# Patient Record
Sex: FEMALE | Race: WHITE | Employment: FULL TIME | URBAN - NONMETROPOLITAN AREA
[De-identification: names, ages, dates, MRNs, and addresses within clinical notes are randomized per-mention and may not be internally consistent; named-entity substitution may affect disease eponyms.]

---

## 2017-01-21 ENCOUNTER — HOSPITAL ENCOUNTER (OUTPATIENT)
Facility: HOSPITAL | Age: 22
Discharge: HOME | End: 2017-01-21
Attending: OBSTETRICS & GYNECOLOGY | Admitting: OBSTETRICS & GYNECOLOGY
Payer: COMMERCIAL

## 2017-01-21 VITALS
TEMPERATURE: 97.4 F | SYSTOLIC BLOOD PRESSURE: 121 MMHG | BODY MASS INDEX: 25.61 KG/M2 | OXYGEN SATURATION: 97 % | WEIGHT: 169 LBS | HEART RATE: 87 BPM | DIASTOLIC BLOOD PRESSURE: 63 MMHG | HEIGHT: 68 IN

## 2017-01-21 PROBLEM — Z36.89 ENCOUNTER FOR TRIAGE IN PREGNANT PATIENT: Status: ACTIVE | Noted: 2017-01-21

## 2017-01-21 LAB
ALBUMIN UR-MCNC: NEGATIVE MG/DL
APPEARANCE UR: CLEAR
BACTERIA #/AREA URNS HPF: ABNORMAL /HPF
BILIRUB UR QL STRIP: NEGATIVE
COLOR UR AUTO: ABNORMAL
GLUCOSE UR STRIP-MCNC: NEGATIVE MG/DL
HGB UR QL STRIP: NEGATIVE
KETONES UR STRIP-MCNC: NEGATIVE MG/DL
LEUKOCYTE ESTERASE UR QL STRIP: NEGATIVE
NITRATE UR QL: NEGATIVE
PH UR STRIP: 6.5 PH (ref 4.7–8)
RBC #/AREA URNS AUTO: 0 /HPF (ref 0–2)
SP GR UR STRIP: 1 (ref 1–1.03)
SQUAMOUS #/AREA URNS AUTO: 3 /HPF (ref 0–1)
URN SPEC COLLECT METH UR: ABNORMAL
UROBILINOGEN UR STRIP-MCNC: NORMAL MG/DL (ref 0–2)
WBC #/AREA URNS AUTO: <1 /HPF (ref 0–2)

## 2017-01-21 PROCEDURE — 99214 OFFICE O/P EST MOD 30 MIN: CPT

## 2017-01-21 PROCEDURE — 81001 URINALYSIS AUTO W/SCOPE: CPT | Performed by: OBSTETRICS & GYNECOLOGY

## 2017-01-21 NOTE — IP AVS SNAPSHOT
MRN:[id number]                      After Visit Summary   1/21/2017    Frances Garcia    MRN: [id number]           Thank you!     Thank you for choosing [facility] for your care. Our goal is always to provide you with excellent care. Hearing back from our patients is one way we can continue to improve our services. Please take a few minutes to complete the written survey that you may receive in the mail after you visit with us. Thank you!        Patient Information     Date Of Birth          1995        About your hospital stay     You were admitted on:  January 21, 2017 You last received care in the:  [facility]    You were discharged on:  January 21, 2017       Who to Call     For medical emergencies, please call 911.  For non-urgent questions about your medical care, please call your primary care provider or clinic, [phone]          Attending Provider     Provider    Macario Rodriguez MD       Primary Care Provider Office Phone # Fax #    Ila NETTIE Vera [phone]       [facility] [address] [place] [address]        Further instructions from your care team       Discharge Instruction for Undelivered Patients      You were seen for: Bleeding Assessment  We Consulted: Dr. Rodriguez  You had (Test or Medicine): Urine     Diet:   Drink 8 to 12 glasses of liquids (milk, juice, water) every day.     Activity:  Rest the pelvic area. No sex. Do not stimulate breasts or nipples.     Call your provider if you notice:  Swelling in your face or increased swelling in your hands or legs.  Headaches that are not relieved by Tylenol (acetaminophen).  Changes in your vision (blurring: seeing spots or stars.)  Nausea (sick to your stomach) and vomiting (throwing up).   Weight gain of 5 pounds or more per week.  Heartburn that doesn't go away.  Signs of bladder infection: pain when you urinate (use the toilet), need to go more often and more urgently.  The  "bag of martinez (rupture of membranes) breaks, or you notice leaking in your underwear.  Bright red blood in your underwear.  Abdominal (lower belly) or stomach pain.  For first baby: Contractions (tightening) less than 5 minutes apart for one hour or more.  Second (plus) baby: Contractions (tightening) less than 10 minutes apart and getting stronger.  *If less than 34 weeks: Contractions (tightenings) more than 6 times in one hour.  Increase or change in vaginal discharge (note the color and amount)    Follow-up:  As scheduled in the clinic          Pending Results     Date and Time Order Name Status Description    2017 1744 UA with Microscopic reflex to Culture In process             Admission Information        Provider Department Dept Phone    2017 Macario Rodriguez MD [facility] [phone]      Your Vitals Were     Blood Pressure Pulse Temperature    121/63 mmHg 87 97.4  F (36.3  C) (Oral)    Height Weight BMI (Body Mass Index)    1.727 m (5' 8\") 76.658 kg (169 lb) 25.70 kg/m2    Pulse Oximetry          97%        MyChart Information     [employer] lets you send messages to your doctor, view your test results, renew your prescriptions, schedule appointments and more. To sign up, go to [email or web address] . Click on \"Log in\" on the left side of the screen, which will take you to the Welcome page. Then click on \"Sign up Now\" on the right side of the page.     You will be asked to enter the access code listed below, as well as some personal information. Please follow the directions to create your username and password.     Your access code is: [email or web address]  Expires: 3/2/2017  5:21 AM     Your access code will  in 90 days. If you need help or a new code, please call your [facility] or [phone].        Care EveryWhere ID     This is your Care EveryWhere ID. This could be used by other organizations to access your [facility] medical records  [id number]           Review of your " medicines      UNREVIEWED medicines. Ask your doctor about these medicines        Dose / Directions    AMOXICILLIN PO   Indication:  Dental infection        Dose:  875 mg   Take 875 mg by mouth 2 times daily   Refills:  0       RANITIDINE HCL PO        Dose:  75 mg   Take 75 mg by mouth daily   Refills:  0       UNABLE TO FIND        Prenatal vit 1 daily   Refills:  0                Protect others around you: Learn how to safely use, store and throw away your medicines at [email or web address].             Medication List: This is a list of all your medications and when to take them. Check marks below indicate your daily home schedule. Keep this list as a reference.      Medications           Morning Afternoon Evening Bedtime As Needed    AMOXICILLIN PO   Take 875 mg by mouth 2 times daily                                RANITIDINE HCL PO   Take 75 mg by mouth daily                                UNABLE TO FIND   Prenatal vit 1 daily

## 2017-01-21 NOTE — IP AVS SNAPSHOT
[facility]    [address] [address]    Phone:  [phone]    Fax:  [phone]                                       After Visit Summary   1/21/2017    Frances Garcia    MRN: [id number]           After Visit Summary Signature Page     I have received my discharge instructions, and my questions have been answered. I have discussed any challenges I see with this plan with the nurse or doctor.    ..........................................................................................................................................  Patient/Patient Representative Signature      ..........................................................................................................................................  Patient Representative Print Name and Relationship to Patient    ..................................................               ................................................  Date                                            Time    ..........................................................................................................................................  Reviewed by Signature/Title    ...................................................              ..............................................  Date                                                            Time

## 2017-01-21 NOTE — PLAN OF CARE
OB Triage Note  Frances Garcia  MRN: [id number]  Gestational Age: 22w2d      Frances Garcia presents for bleeding/spotting (sign/symptom/concern).      Not adam, started cramping today. Bleeding: spotting  began this afternoon.  Denies LOF.      Dr Rodriguez notified of arrival and condition.  Oriented patient to surroundings. Call light within reach.     FHR: 145 with doppler    Plan:  -Doppler FHR  -Sterile vaginal exam/sterile speculum exam.  -Nursing education on symptoms of  labor provided.

## 2017-01-22 NOTE — DISCHARGE INSTRUCTIONS
Discharge Instruction for Undelivered Patients      You were seen for: Bleeding Assessment  We Consulted: Dr. Rodriguez  You had (Test or Medicine): Urine     Diet:   Drink 8 to 12 glasses of liquids (milk, juice, water) every day.     Activity:  Rest the pelvic area. No sex. Do not stimulate breasts or nipples.     Call your provider if you notice:  Swelling in your face or increased swelling in your hands or legs.  Headaches that are not relieved by Tylenol (acetaminophen).  Changes in your vision (blurring: seeing spots or stars.)  Nausea (sick to your stomach) and vomiting (throwing up).   Weight gain of 5 pounds or more per week.  Heartburn that doesn't go away.  Signs of bladder infection: pain when you urinate (use the toilet), need to go more often and more urgently.  The bag of martinez (rupture of membranes) breaks, or you notice leaking in your underwear.  Bright red blood in your underwear.  Abdominal (lower belly) or stomach pain.  For first baby: Contractions (tightening) less than 5 minutes apart for one hour or more.  Second (plus) baby: Contractions (tightening) less than 10 minutes apart and getting stronger.  *If less than 34 weeks: Contractions (tightenings) more than 6 times in one hour.  Increase or change in vaginal discharge (note the color and amount)    Follow-up:  As scheduled in the clinic

## 2017-01-22 NOTE — PLAN OF CARE
"Frances Garcia is a 21 year old  and 22w2d patient came in complaining of Vaginal Bleeding    Patient Active Problem List   Diagnosis     Encounter for triage in pregnant patient       Pt discharged to home at 6:12 PM and encouraged to rest and drink plenty of fluids.  Pt told to call/return if bleeding more than spotting, water breaks, contractions 3-5 minutes apart that she has to breath through.     Nursing education on  labor symptoms provided. Self-management instructions reviewed. AVS given and signed. All questions answered and patient verbalizes understanding.    /63 mmHg  Pulse 87  Temp(Src) 97.4  F (36.3  C) (Oral)  Ht 1.727 m (5' 8\")  Wt 76.658 kg (169 lb)  BMI 25.70 kg/m2  SpO2 97%    Cervical status: not examined  Fetal Assessment: Appropriate for Gestational Age    Discharge support:  Family/Friend Support    Obstetric History       T0      TAB0   SAB0   E0   M0   L0       # Outcome Date GA Lbr Srini/2nd Weight Sex Delivery Anes PTL Lv   1 Current                   Makenzie Aguirre      "

## 2017-05-24 ENCOUNTER — HOSPITAL ENCOUNTER (OUTPATIENT)
Facility: HOSPITAL | Age: 22
Discharge: HOME | End: 2017-05-24
Attending: OBSTETRICS & GYNECOLOGY | Admitting: OBSTETRICS & GYNECOLOGY
Payer: COMMERCIAL

## 2017-05-24 ENCOUNTER — TELEPHONE (OUTPATIENT)
Dept: OBGYN | Facility: HOSPITAL | Age: 22
End: 2017-05-24

## 2017-05-24 VITALS — DIASTOLIC BLOOD PRESSURE: 76 MMHG | TEMPERATURE: 97.4 F | OXYGEN SATURATION: 97 % | SYSTOLIC BLOOD PRESSURE: 124 MMHG

## 2017-05-24 PROCEDURE — 99213 OFFICE O/P EST LOW 20 MIN: CPT

## 2017-05-24 PROCEDURE — 99213 OFFICE O/P EST LOW 20 MIN: CPT | Mod: 25

## 2017-05-24 PROCEDURE — 59025 FETAL NON-STRESS TEST: CPT | Mod: 59

## 2017-05-24 NOTE — IP AVS SNAPSHOT
MRN:[id number]                      After Visit Summary   5/24/2017    Frances Garcia    MRN: [id number]           Thank you!     Thank you for choosing [facility] for your care. Our goal is always to provide you with excellent care. Hearing back from our patients is one way we can continue to improve our services. Please take a few minutes to complete the written survey that you may receive in the mail after you visit with us. Thank you!        Patient Information     Date Of Birth          1995        Designated Caregiver       Most Recent Value    Caregiver    Will someone help with your care after discharge? no      About your hospital stay     You were admitted on:  May 24, 2017 You last received care in the:  [facility]    You were discharged on:  May 24, 2017       Who to Call     For medical emergencies, please call 911.  For non-urgent questions about your medical care, please call your primary care provider or clinic, [phone]          Attending Provider     Provider Specialty    Landon Rutherford MD OB/Gyn       Primary Care Provider Office Phone # Fax #    Ila Vera [phone]       [facility] [address] [facility] [address]        Further instructions from your care team       Discharge Instructions for Undelivered Patients    Diet:  * Drink 8 to 12 glasses of liquids (milk, juice, water) every day  * You may eat meals and snacks.    Activity:  * Count fetal kicks every day.  * Call your doctor if your baby is moving less than usual.    Call your provider if you notice:  * Swelling in your face or increased swelling in your hands or legs.  * Headaches that are not relieved by Tylenol (acetaminophen).  * Changes in your vision (blurring; seeing spots or stars).  * Nausea (sick to your stomach) and vomiting (throwing up).  * Weight gain of 5 pounds per week.  * Heartburn that doesn't go away.  * Signs of bladder infection:  "Pain when you urinate (use the toilet), needing to go more often or more urgently.  * The bag of martinez (membrane) breaks, or you notice leaking in your underwear.  * Bright red blood in your underwear.  * Abdominal (lower belly) or stomach pain.  * For first baby: Contractions (tightenings) less than 5 minutes apart for one hour or more.  * Second (plus) baby: Contractions (tightenings) less than 10 minutes apart and getting stronger.  * Increase or change in vaginal discharge (note the color and amount).    Return to [facility] when contractions become more painful.    [facility]: [phone]        Pending Results     No orders found from 2017 to 2017.            Admission Information     Date & Time Provider Department Dept. Phone    2017 Landon Rutherford MD [facility] [phone]      Your Vitals Were     Blood Pressure Temperature Pulse Oximetry             124/76 97.4  F (36.3  C) (Oral) 97%         MyChart Information     [email or web address] lets you send messages to your doctor, view your test results, renew your prescriptions, schedule appointments and more. To sign up, go to [email or web address] . Click on \"Log in\" on the left side of the screen, which will take you to the Welcome page. Then click on \"Sign up Now\" on the right side of the page.     You will be asked to enter the access code listed below, as well as some personal information. Please follow the directions to create your username and password.     Your access code is: [email or web address]  Expires: 2017 10:32 AM     Your access code will  in 90 days. If you need help or a new code, please call your [facility] clinic or [phone].        Care EveryWhere ID     This is your Care EveryWhere ID. This could be used by other organizations to access your [facility] medical records  [id number]           Review of your medicines      UNREVIEWED medicines. Ask your doctor about these medicines        " Dose / Directions    AMOXICILLIN PO   Indication:  Dental infection        Dose:  875 mg   Take 875 mg by mouth 2 times daily   Refills:  0       RANITIDINE HCL PO        Dose:  75 mg   Take 75 mg by mouth daily   Refills:  0       UNABLE TO FIND        Prenatal vit 1 daily   Refills:  0                Protect others around you: Learn how to safely use, store and throw away your medicines at www.disposemymeds.org.             Medication List: This is a list of all your medications and when to take them. Check marks below indicate your daily home schedule. Keep this list as a reference.      Medications           Morning Afternoon Evening Bedtime As Needed    AMOXICILLIN PO   Take 875 mg by mouth 2 times daily                                RANITIDINE HCL PO   Take 75 mg by mouth daily                                UNABLE TO FIND   Prenatal vit 1 daily

## 2017-05-24 NOTE — DISCHARGE INSTRUCTIONS
Discharge Instructions for Undelivered Patients    Diet:  * Drink 8 to 12 glasses of liquids (milk, juice, water) every day  * You may eat meals and snacks.    Activity:  * Count fetal kicks every day.  * Call your doctor if your baby is moving less than usual.    Call your provider if you notice:  * Swelling in your face or increased swelling in your hands or legs.  * Headaches that are not relieved by Tylenol (acetaminophen).  * Changes in your vision (blurring; seeing spots or stars).  * Nausea (sick to your stomach) and vomiting (throwing up).  * Weight gain of 5 pounds per week.  * Heartburn that doesn't go away.  * Signs of bladder infection: Pain when you urinate (use the toilet), needing to go more often or more urgently.  * The bag of martinez (membrane) breaks, or you notice leaking in your underwear.  * Bright red blood in your underwear.  * Abdominal (lower belly) or stomach pain.  * For first baby: Contractions (tightenings) less than 5 minutes apart for one hour or more.  * Second (plus) baby: Contractions (tightenings) less than 10 minutes apart and getting stronger.  * Increase or change in vaginal discharge (note the color and amount).    Return to [facility] when contractions become more painful.    [facility]: [phone]

## 2017-05-24 NOTE — TELEPHONE ENCOUNTER
2017 9:21 AM  Frances Garcia 1995 [phone] (home)   Pt of Dr: No data on file.   Estimated Date of Delivery: May 25, 2017 39w6d    Patient Active Problem List   Diagnosis     Encounter for triage in pregnant patient         Spoke with Frances Garcia   Pt lives 10 miles away.  Reason for call per pt  contractions  Are you having contractions? Yes.  Contractions since this am around 8am.  Contractions are irregular, maybe every 10-15 minutes.  Pain with contractions are a 10/10.   Are you having any bloody show: No  Are you leaking any fluids/ has your water broken? No  Is your baby moving normally: Yes  Did you have any complications with this or a previous pregnancy? Yes: bleeding at 26 wks gestation, pt stated she was diagnosed with a low lying placenta, but pt stated they told her the placenta has moved up.      Backache: Yes.  Pt reports she has had back pain.    Pressure: Yes.  Pt reports pressure vaginal pressure.   Vaginal discharge? No  Cervical Status: Pt stated she was 2cm on Thursday last week when seen in [place], per Dr Ojeda.     Patient advised: That she is welcome to come to the hospital and be checked PRN.  Reviewed the signs of early labor and how close we want the contractions to be for a primip.  Pt stated she would like to come in and be seen.  Pt stated she normally doctors in [place] but wants to delivery in [place] because she lives closer.  Will await pt's arrival      Call taken by Mel Byers

## 2017-05-24 NOTE — PLAN OF CARE
Pt discharged to home via ambulatory.  AVS signed and pt questions answered.  Pt will follow up at [facility] when she feels like labor is progressing with contractions becoming more painful.

## 2017-05-24 NOTE — IP AVS SNAPSHOT
[facility]    [address] [address]    Phone:  [phone]    Fax:  [phone]                                       After Visit Summary   5/24/2017    Frances Garcia    MRN: [id number]           After Visit Summary Signature Page     I have received my discharge instructions, and my questions have been answered. I have discussed any challenges I see with this plan with the nurse or doctor.    ..........................................................................................................................................  Patient/Patient Representative Signature      ..........................................................................................................................................  Patient Representative Print Name and Relationship to Patient    ..................................................               ................................................  Date                                            Time    ..........................................................................................................................................  Reviewed by Signature/Title    ...................................................              ..............................................  Date                                                            Time

## 2017-05-24 NOTE — PLAN OF CARE
OB Triage Note  Frances Garcia  MRN: [id number]  Gestational Age: 39w6d      Frances Garcia presents for contractions (sign/symptom/concern).      States adam since 8 am.  Rates pain at 2/10.  Bleeding: light  began at 8:00 am.  Denies LOF.  Reports none  amount.    Dr Rutherford notified of arrival and condition.  Oriented patient to surroundings. Call light within reach.     FHT: reassuring  NST Start Time:953  NST Stop Time:1013  NST: Reactive.  Uterine Assessment:Contractions: frequency q 2-3 minutes of mild quality.    Plan:  -Initial NST, then fetal/uterine monitoring per MD/patient plan.  -Sterile vaginal exam/sterile speculum exam.  -Nursing education on early labor provided.

## 2017-05-30 NOTE — PROGRESS NOTES
Fetal Non-Stress Test Results    NST Ordered By: Landon Rutherford       NST Start & Stop Times                                  NST Results  Fetus A   Baseline Rate: 130  Accelerations: Present  Decelerations: None  Interpretation: reactive

## 2018-10-28 ENCOUNTER — HOSPITAL ENCOUNTER (EMERGENCY)
Facility: HOSPITAL | Age: 23
Discharge: HOME | End: 2018-10-28
Attending: PHYSICIAN ASSISTANT | Admitting: PHYSICIAN ASSISTANT
Payer: COMMERCIAL

## 2018-10-28 VITALS
SYSTOLIC BLOOD PRESSURE: 141 MMHG | RESPIRATION RATE: 16 BRPM | OXYGEN SATURATION: 100 % | DIASTOLIC BLOOD PRESSURE: 90 MMHG | TEMPERATURE: 98.1 F | HEART RATE: 91 BPM

## 2018-10-28 DIAGNOSIS — S81.811A LACERATION OF RIGHT LOWER EXTREMITY, INITIAL ENCOUNTER: ICD-10-CM

## 2018-10-28 PROCEDURE — 12002 RPR S/N/AX/GEN/TRNK2.6-7.5CM: CPT

## 2018-10-28 PROCEDURE — 12002 RPR S/N/AX/GEN/TRNK2.6-7.5CM: CPT | Performed by: PHYSICIAN ASSISTANT

## 2018-10-28 PROCEDURE — 40000268 ZZH STATISTIC NO CHARGES

## 2018-10-28 ASSESSMENT — ENCOUNTER SYMPTOMS
PSYCHIATRIC NEGATIVE: 1
WOUND: 1
CARDIOVASCULAR NEGATIVE: 1
RESPIRATORY NEGATIVE: 1
CONSTITUTIONAL NEGATIVE: 1

## 2018-10-28 NOTE — DISCHARGE INSTRUCTIONS
- Clean/dry for 24 hrs.   - Dressing changes daily for 2-3 days.   - Rest, elevate, tylenol for pain.   - Monitor for infection.   - Follow up in 10-14 days for suture removal, sooner with concern for infection.

## 2018-10-28 NOTE — ED AVS SNAPSHOT
[facility]    [address] [address]    Phone:  [phone]                                       Frances Garcia   MRN: [id number]    Department:  [facility]   Date of Visit:  10/28/2018           Patient Information     Date Of Birth          1995        Your diagnoses for this visit were:     Laceration of right lower extremity, initial encounter        You were seen by Jewel Frazier PA.      Follow-up Information     Follow up with Ila Vera NP In 10 days.    Specialty:  Nurse Practitioner    Why:  For suture removal    Contact information:    [facility]  [address] [facility] [address] [phone]          Follow up with [facility].    Specialty:  EMERGENCY MEDICINE    Why:  If symptoms worsen    Contact information:    [address] [address] [phone]    Additional information:    From [place]: Take [address]. Turn left at [address]. Turn left at the first stoplight on [address]. At the first stop sign, take a right onto [address]. Take a left into the parking lot and continue through until you reach the North enterance of the building.       From [place]: Take [address]. Take the [address] ramp towards [place]. Turn left onto [address]. Take a slight right onto [address] [facility]. Turn left at the first stoplight on [address]. At the first stop sign, take a right onto [address]. Take a left into the parking lot and continue through until you reach the North enterance of the building.       From [place]: Take [address]. Take a right at [address]. At the first stop sign, take a right onto [address]. Take a left into the parking lot and continue through until you reach the North enterance of the building.         Discharge Instructions       - Clean/dry for 24 hrs.   - Dressing changes  "daily for 2-3 days.   - Rest, elevate, tylenol for pain.   - Monitor for infection.   - Follow up in 10-14 days for suture removal, sooner with concern for infection.     Discharge References/Attachments     LACERATION, EXTREMITY: STITCHES, STAPLE, OR TAPE (ENGLISH)         Review of your medicines      Notice     You have not been prescribed any medications.            Procedures and tests performed during your visit     Laceration repair      Orders Needing Specimen Collection     None      Pending Results     No orders found from 10/26/2018 to 10/29/2018.            Pending Culture Results     No orders found from 10/26/2018 to 10/29/2018.            Thank you for choosing [facility]       Thank you for choosing [facility] for your care. Our goal is always to provide you with excellent care. Hearing back from our patients is one way we can continue to improve our services. Please take a few minutes to complete the written survey that you may receive in the mail after you visit with us. Thank you!        [employer] Information     Shoutlet lets you send messages to your doctor, view your test results, renew your prescriptions, schedule appointments and more. To sign up, go to [email or web address] . Click on \"Log in\" on the left side of the screen, which will take you to the Welcome page. Then click on \"Sign up Now\" on the right side of the page.     You will be asked to enter the access code listed below, as well as some personal information. Please follow the directions to create your username and password.     Your access code is: [email or web address]  Expires: 2019  6:26 PM     Your access code will  in 90 days. If you need help or a new code, please call your [facility] clinic or [phone].        Care EveryWhere ID     This is your Care EveryWhere ID. This could be used by other organizations to access your [facility] medical records  [id number]        Equal Access to Services     HILTON PULIDO AH: Michael guillermo " bruno Simons, shyla garrison, eagle stoner, denisse goddard. So [facility] [phone].    ATENCIÓN: Si habla español, tiene a chirinos disposición servicios gratuitos de asistencia lingüística. Llame al [phone].    We comply with applicable federal civil rights laws and [place] laws. We do not discriminate on the basis of race, color, national origin, age, disability, sex, sexual orientation, or gender identity.            After Visit Summary       This is your record. Keep this with you and show to your community pharmacist(s) and doctor(s) at your next visit.

## 2018-10-28 NOTE — ED TRIAGE NOTES
Pt presents today with family for a laceration to her right inner proximal shin she sustained on a piece of video game equipment she was trying to throw away.

## 2018-10-28 NOTE — ED AVS SNAPSHOT
[facility]    [address] [address]    Phone:  [phone]                                       Frances Garcia   MRN: [id number]    Department:  [facility]   Date of Visit:  10/28/2018           After Visit Summary Signature Page     I have received my discharge instructions, and my questions have been answered. I have discussed any challenges I see with this plan with the nurse or doctor.    ..........................................................................................................................................  Patient/Patient Representative Signature      ..........................................................................................................................................  Patient Representative Print Name and Relationship to Patient    ..................................................               ................................................  Date                                   Time    ..........................................................................................................................................  Reviewed by Signature/Title    ...................................................              ..............................................  Date                                               Time          22EPIC Rev 08/18

## 2018-10-28 NOTE — ED PROVIDER NOTES
History     Chief Complaint   Patient presents with     Laceration     right leg laceration, bleeding controlled     HPI  Frances Garcia is a 22 year old female who presents with lac to right lower leg. She cut her leg with a motherboard from an old game console prior to arrival. She states it is too wide to just leave alone, so she came in for wound repair. Bleeding controlled. Last tetanus was last year.     Problem List:    Patient Active Problem List    Diagnosis Date Noted     Encounter for triage in pregnant patient 01/21/2017     Priority: Medium        Past Medical History:    Past Medical History:   Diagnosis Date     Uncomplicated asthma        Past Surgical History:    Past Surgical History:   Procedure Laterality Date     ESOPHAGOSCOPY, GASTROSCOPY, DUODENOSCOPY (EGD), COMBINED N/A 11/6/2015    Procedure: COMBINED ESOPHAGOSCOPY, GASTROSCOPY, DUODENOSCOPY (EGD);  Surgeon: Gregory Boyce MD;  Location: [facility]       Family History:    No family history on file.    Social History:  Marital Status:  Single [1]  Social History   Substance Use Topics     Smoking status: Never Smoker     Smokeless tobacco: Never Used     Alcohol use No        Medications:      No current outpatient prescriptions on file.      Review of Systems   Constitutional: Negative.    Respiratory: Negative.    Cardiovascular: Negative.    Skin: Positive for wound.   Psychiatric/Behavioral: Negative.        Physical Exam   BP: 141/90  Pulse: 91  Temp: 98.1  F (36.7  C)  Resp: 16  SpO2: 100 %      Physical Exam   Constitutional: She is oriented to person, place, and time. She appears well-developed and well-nourished. No distress.   Cardiovascular: Normal rate.    Pulmonary/Chest: Effort normal.   Musculoskeletal:        Legs:  Neurological: She is alert and oriented to person, place, and time.   Skin: Skin is warm and dry.   Psychiatric: She has a normal mood and affect.   Nursing note and vitals reviewed.      ED Course     ED  Course     Laceration repair  Date/Time: 10/28/2018 5:55 PM  Performed by: YANET FRAZIER  Authorized by: YANET FRAZIER   Consent: Verbal consent obtained.  Risks and benefits: risks, benefits and alternatives were discussed  Consent given by: patient  Body area: lower extremity  Location details: right lower leg  Laceration length: 5.5 cm  Foreign bodies: no foreign bodies  Tendon involvement: none  Nerve involvement: none  Anesthesia: local infiltration    Anesthesia:  Local Anesthetic: lidocaine 1% with epinephrine  Anesthetic total: 8 mL  Preparation: Patient was prepped and draped in the usual sterile fashion.  Irrigation solution: saline  Irrigation method: syringe  Amount of cleaning: standard  Debridement: none  Degree of undermining: none  Skin closure: 4-0 nylon  Number of sutures: 7  Technique: simple  Approximation: close  Approximation difficulty: simple  Dressing: antibiotic ointment  Patient tolerance: Patient tolerated the procedure well with no immediate complications          Assessments & Plan (with Medical Decision Making)     I have reviewed the nursing notes.  I have reviewed the findings, diagnosis, plan and need for follow up with the patient.    There are no discharge medications for this patient.      Final diagnoses:   Laceration of right lower extremity, initial encounter   7 sutures placed as above. Clean/dry for 24 hrs. Dressing changes daily for 2-3 days. Rest, elevate, tylenol for pain. Monitor for infection. Follow up in 10-14 days for suture removal, sooner with concern for infection. Patient verbally educated and given appropriate education sheets for the diagnoses and has no questions.    Yanet Frazier PA-C   10/28/2018   7:30 PM        10/28/2018   [facility]     Yanet Frazier PA  10/28/18 1931

## 2022-11-26 ENCOUNTER — HOSPITAL ENCOUNTER (EMERGENCY)
Facility: HOSPITAL | Age: 27
Discharge: HOME | End: 2022-11-26
Attending: NURSE PRACTITIONER | Admitting: NURSE PRACTITIONER
Payer: COMMERCIAL

## 2022-11-26 VITALS
TEMPERATURE: 97.3 F | RESPIRATION RATE: 16 BRPM | HEART RATE: 102 BPM | SYSTOLIC BLOOD PRESSURE: 111 MMHG | DIASTOLIC BLOOD PRESSURE: 79 MMHG | OXYGEN SATURATION: 97 %

## 2022-11-26 DIAGNOSIS — J45.901 ASTHMA EXACERBATION: Primary | ICD-10-CM

## 2022-11-26 DIAGNOSIS — R05.1 ACUTE COUGH: ICD-10-CM

## 2022-11-26 DIAGNOSIS — J45.901 EXACERBATION OF ASTHMA, UNSPECIFIED ASTHMA SEVERITY, UNSPECIFIED WHETHER PERSISTENT: ICD-10-CM

## 2022-11-26 PROCEDURE — G0463 HOSPITAL OUTPT CLINIC VISIT: HCPCS

## 2022-11-26 PROCEDURE — C9803 HOPD COVID-19 SPEC COLLECT: HCPCS

## 2022-11-26 PROCEDURE — 99213 OFFICE O/P EST LOW 20 MIN: CPT | Performed by: NURSE PRACTITIONER

## 2022-11-26 PROCEDURE — 87637 SARSCOV2&INF A&B&RSV AMP PRB: CPT | Performed by: NURSE PRACTITIONER

## 2022-11-26 RX ORDER — METHYLPREDNISOLONE 4 MG
TABLET, DOSE PACK ORAL
Qty: 21 TABLET | Refills: 0 | Status: SHIPPED | OUTPATIENT
Start: 2022-11-26 | End: 2023-01-19

## 2022-11-26 ASSESSMENT — ENCOUNTER SYMPTOMS
COUGH: 1
SORE THROAT: 0
SHORTNESS OF BREATH: 1
CHILLS: 0
FEVER: 0

## 2022-11-26 NOTE — ED TRIAGE NOTES
Pt presents with c/o headache, body aches, sweats, coughing. Reports hx of asthma. States that she had to use her albuterol inhaler 10x yesterday. Sx started yesterday. No otc meds.      Triage Assessment     Row Name 11/26/22 1301       Triage Assessment (Adult)    Airway WDL WDL       Respiratory WDL    Respiratory WDL WDL       Skin Circulation/Temperature WDL    Skin Circulation/Temperature WDL WDL       Cardiac WDL    Cardiac WDL WDL

## 2022-11-26 NOTE — ED PROVIDER NOTES
History     Chief Complaint   Patient presents with     Cough     Nasal Congestion     HPI  Frances Garcia is a 27 year old female who presents to urgent care for evaluation of a cough that started yesterday. She reports having intermittent coughing fits accompanied by sweating and shortness of breath and wheezing. History if asthma but has not had to use her albuterol inhaler for several months. Yesterday, she had to use it about 10 times per her report.  Slight chest pain with coughing.  No tobacco use.  No known fevers at home.  Eating and drinking okay.    Patient states that symptoms are similar to when she had influenza in the past.    Allergies:  Allergies   Allergen Reactions     Dust Mites        Problem List:    Patient Active Problem List    Diagnosis Date Noted     Encounter for triage in pregnant patient 01/21/2017     Priority: Medium        Past Medical History:    Past Medical History:   Diagnosis Date     Uncomplicated asthma        Past Surgical History:    Past Surgical History:   Procedure Laterality Date     ESOPHAGOSCOPY, GASTROSCOPY, DUODENOSCOPY (EGD), COMBINED N/A 11/6/2015    Procedure: COMBINED ESOPHAGOSCOPY, GASTROSCOPY, DUODENOSCOPY (EGD);  Surgeon: Gregory Boyce MD;  Location: [facility]       Family History:    History reviewed. No pertinent family history.    Social History:  Marital Status:  Single [1]  Social History     Tobacco Use     Smoking status: Never     Smokeless tobacco: Never   Substance Use Topics     Alcohol use: No     Drug use: No        Medications:    methylPREDNISolone (MEDROL DOSEPAK) 4 MG tablet therapy pack          Review of Systems   Constitutional: Negative for chills and fever.   HENT: Negative for sore throat.    Respiratory: Positive for cough and shortness of breath.    Cardiovascular: Positive for chest pain (With coughing).   All other systems reviewed and are negative.      Physical Exam   BP: 111/79  Pulse: 102  Temp: 97.3  F (36.3  C)  Resp:  16  SpO2: 97 %      Physical Exam  Vitals and nursing note reviewed.   Constitutional:       Appearance: Normal appearance. She is not ill-appearing or toxic-appearing.   HENT:      Head: Atraumatic.      Right Ear: Tympanic membrane and ear canal normal.      Left Ear: Tympanic membrane and ear canal normal.      Nose: Nose normal.      Mouth/Throat:      Mouth: Mucous membranes are moist.   Eyes:      Extraocular Movements: Extraocular movements intact.      Pupils: Pupils are equal, round, and reactive to light.   Cardiovascular:      Rate and Rhythm: Normal rate and regular rhythm.      Heart sounds: Normal heart sounds. No murmur heard.    No friction rub. No gallop.   Pulmonary:      Effort: Pulmonary effort is normal. No respiratory distress.      Breath sounds: Normal breath sounds. No wheezing or rhonchi.   Musculoskeletal:         General: Normal range of motion.      Cervical back: Neck supple.   Skin:     General: Skin is warm and dry.      Coloration: Skin is not pale.   Neurological:      Mental Status: She is alert and oriented to person, place, and time.         ED Course                 Procedures            No results found for this or any previous visit (from the past 24 hour(s)).    Medications - No data to display    Assessments & Plan (with Medical Decision Making)     I have reviewed the nursing notes.    This is a 27-year-old female that presented for evaluation of acute cough that started yesterday.  Known history of asthma but has not had to use her rescue inhaler for several months.  Patient has a regular heart rate and rhythm.  Respirations are nonlabored.  No adventitious lung sounds auscultated today.  Patient is talking in full sentences.  Her oxygen saturation is 97% on room air.  The rest of her vital signs are within normal limits.    Discussed all findings with patient.  Respiratory viral panel results are pending.  Symptoms appear consistent with acute bronchitis/asthma  exacerbation.  Patient does not appear to be in respiratory distress at this time.  Using shared decision making, imaging was deferred today.  Will treat with a Medrol Dosepak.  Advised patient to continue using inhaler as needed.  Tylenol or ibuprofen as needed for pain.  If symptoms worsen or she develops new or concerning symptoms patient was advised to return to the emergency department for reevaluation.    I have reviewed the findings, diagnosis, plan and need for follow up with the patient.  This document was prepared using a combination of typing and voice generated software.  While every attempt was made for accuracy, spelling and grammatical errors may exist.    Discharge Medication List as of 11/26/2022  1:53 PM      START taking these medications    Details   methylPREDNISolone (MEDROL DOSEPAK) 4 MG tablet therapy pack Follow Package Directions, Disp-21 tablet, R-0, E-Prescribe             Final diagnoses:   Asthma exacerbation   Acute cough       11/26/2022   [facility]     Mpofu, Prudence, CNP  11/26/22 3756

## 2022-11-26 NOTE — DISCHARGE INSTRUCTIONS
Take the steroid as prescribed.  Continue using albuterol inhaler as needed.    Tylenol as needed for any pain or fever.    We will notify you of your results when available.    Return to emergency department for any worsening or concerning symptoms.

## 2022-11-27 LAB
FLUAV RNA SPEC QL NAA+PROBE: POSITIVE
FLUBV RNA RESP QL NAA+PROBE: NEGATIVE
RSV RNA SPEC NAA+PROBE: NEGATIVE
SARS-COV-2 RNA RESP QL NAA+PROBE: NEGATIVE

## 2023-01-05 ENCOUNTER — TRANSFERRED RECORDS (OUTPATIENT)
Dept: HEALTH INFORMATION MANAGEMENT | Facility: CLINIC | Age: 28
End: 2023-01-05

## 2023-01-19 ENCOUNTER — OFFICE VISIT (OUTPATIENT)
Dept: OBGYN | Facility: OTHER | Age: 28
End: 2023-01-19
Attending: NURSE PRACTITIONER
Payer: COMMERCIAL

## 2023-01-19 VITALS
HEIGHT: 69 IN | SYSTOLIC BLOOD PRESSURE: 115 MMHG | OXYGEN SATURATION: 98 % | DIASTOLIC BLOOD PRESSURE: 68 MMHG | WEIGHT: 249.5 LBS | HEART RATE: 80 BPM | BODY MASS INDEX: 36.95 KG/M2

## 2023-01-19 DIAGNOSIS — N89.8 VAGINAL DISCHARGE: Primary | ICD-10-CM

## 2023-01-19 DIAGNOSIS — N76.0 BACTERIAL VAGINOSIS: ICD-10-CM

## 2023-01-19 DIAGNOSIS — B96.89 BACTERIAL VAGINOSIS: ICD-10-CM

## 2023-01-19 LAB
CLUE CELLS: PRESENT
TRICHOMONAS, WET PREP: ABNORMAL
WBC'S/HIGH POWER FIELD, WET PREP: ABNORMAL
YEAST, WET PREP: ABNORMAL

## 2023-01-19 PROCEDURE — 87210 SMEAR WET MOUNT SALINE/INK: CPT | Mod: ZL | Performed by: NURSE PRACTITIONER

## 2023-01-19 PROCEDURE — 87070 CULTURE OTHR SPECIMN AEROBIC: CPT | Mod: ZL | Performed by: NURSE PRACTITIONER

## 2023-01-19 PROCEDURE — G0463 HOSPITAL OUTPT CLINIC VISIT: HCPCS | Mod: 25 | Performed by: COUNSELOR

## 2023-01-19 PROCEDURE — 99213 OFFICE O/P EST LOW 20 MIN: CPT | Performed by: NURSE PRACTITIONER

## 2023-01-19 PROCEDURE — G0463 HOSPITAL OUTPT CLINIC VISIT: HCPCS | Performed by: COUNSELOR

## 2023-01-19 RX ORDER — METRONIDAZOLE 500 MG/1
TABLET ORAL
Qty: 6 TABLET | Refills: 1 | Status: SHIPPED | OUTPATIENT
Start: 2023-01-19 | End: 2023-03-28

## 2023-01-19 RX ORDER — METRONIDAZOLE 7.5 MG/G
1 GEL VAGINAL AT BEDTIME
Qty: 25 G | Refills: 0 | Status: SHIPPED | OUTPATIENT
Start: 2023-01-19 | End: 2023-01-23

## 2023-01-19 ASSESSMENT — PAIN SCALES - GENERAL: PAINLEVEL: NO PAIN (0)

## 2023-01-20 PROBLEM — Z36.89 ENCOUNTER FOR TRIAGE IN PREGNANT PATIENT: Status: RESOLVED | Noted: 2017-01-21 | Resolved: 2023-01-20

## 2023-01-20 NOTE — PROGRESS NOTES
"CC:  Consult from Ila Vera NP  for recurrent vaginal infections    HPI:  Frances Garcia is a 27 year old female is a   P1.  Periods are somewhat irregular due to Nexplanon implant. She notes that since April 2022 she has been experiencing vaginal itching, burning, discharge, and odor mainly following her period and after intercourse.  She has had the same partner for 7 years. She denies any changes in laundry or femanine products.  She has started using Vagesil feminine wash but thinks this may have made it worse. She reports being treated both vaginally and orally but symptoms recur.   .   Patients records are available and reviewed at today's visit.    Past GYN history:  No STD history       Last PAP smear:  Normal      Past Medical History:   Diagnosis Date     Uncomplicated asthma        Past Surgical History:   Procedure Laterality Date     ESOPHAGOSCOPY, GASTROSCOPY, DUODENOSCOPY (EGD), COMBINED N/A 11/6/2015    Procedure: COMBINED ESOPHAGOSCOPY, GASTROSCOPY, DUODENOSCOPY (EGD);  Surgeon: Gregory Boyce MD;  Location: [facility]       No family history on file.    Allergies: Dust mites    Current Outpatient Medications   Medication Sig Dispense Refill     etonogestrel (NEXPLANON) 68 MG IMPL 1 each by Subdermal route once Placed July 2020       metroNIDAZOLE (FLAGYL) 500 MG tablet One tablet weekly by mouth 6 tablet 1     metroNIDAZOLE (METROGEL) 0.75 % vaginal gel Place 1 applicator (5 g) vaginally At Bedtime for 5 doses 25 g 0     ALBUTEROL IN Inhale into the lungs as needed (Patient not taking: Reported on 1/19/2023)         ROS:  CONSTITUTIONAL:NEGATIVE for fever, chills, change in weight  : negative for, dysparunia, urinary tract infection and bleeding    EXAM:  Blood pressure 115/68, pulse 80, height 1.753 m (5' 9\"), weight 113.2 kg (249 lb 8 oz), SpO2 98 %, unknown if currently breastfeeding.   BMI= Body mass index is 36.84 kg/m .  General - pleasant female in no acute distress.  Pelvic " - EG: normal adult female, BUS: within normal limits, Vagina: well rugated, creamy, malodorous discharge, Cervix: no lesions or CMT, Uterus: firm, normal sized and nontender, Adnexae: no masses or tenderness.  Rectovaginal - deferred.  Musculoskeletal - no gross deformities.  Neurological - normal strength, sensation, and mental status.      ASSESSMENT/PLAN:  (N89.8) Vaginal discharge  (primary encounter diagnosis)  Comment:   Plan: Wet prep, Vaginal Fluid Aerobic Bacterial         Culture Routine            (N76.0,  B96.89) Bacterial vaginosis  Comment:   Plan: metroNIDAZOLE (METROGEL) 0.75 % vaginal gel,         metroNIDAZOLE (FLAGYL) 500 MG tablet        Switch to all hypoallergenic products for hygiene.   Complete 5 night vaginal treatment then begin once weekly oral metronidazole.  Return in 6-8 weeks.         Letter will be sent to the referring provider    SAMIRA Doyle

## 2023-01-22 LAB — BACTERIA VAG AEROBE CULT: ABNORMAL

## 2023-03-02 ENCOUNTER — OFFICE VISIT (OUTPATIENT)
Dept: OBGYN | Facility: OTHER | Age: 28
End: 2023-03-02
Attending: NURSE PRACTITIONER
Payer: COMMERCIAL

## 2023-03-02 VITALS
BODY MASS INDEX: 36.95 KG/M2 | HEART RATE: 85 BPM | HEIGHT: 69 IN | OXYGEN SATURATION: 98 % | SYSTOLIC BLOOD PRESSURE: 113 MMHG | DIASTOLIC BLOOD PRESSURE: 62 MMHG | WEIGHT: 249.5 LBS

## 2023-03-02 DIAGNOSIS — N76.0 VAGINITIS AND VULVOVAGINITIS: Primary | ICD-10-CM

## 2023-03-02 PROCEDURE — G0463 HOSPITAL OUTPT CLINIC VISIT: HCPCS

## 2023-03-02 PROCEDURE — 99213 OFFICE O/P EST LOW 20 MIN: CPT | Performed by: NURSE PRACTITIONER

## 2023-03-02 RX ORDER — TRIAMCINOLONE ACETONIDE 0.25 MG/G
OINTMENT TOPICAL 2 TIMES DAILY
Qty: 15 G | Refills: 3 | Status: SHIPPED | OUTPATIENT
Start: 2023-03-02

## 2023-03-02 ASSESSMENT — PAIN SCALES - GENERAL: PAINLEVEL: NO PAIN (0)

## 2023-03-02 NOTE — PROGRESS NOTES
"[facility]                HPI   Follow up today for ongoing vaginitis concerns.  She did a vaginal treatment of metronidazole and has been taking a once weekly metronidazole to follow.  She states vaginal ododr has improved and discharge has resolved.  She does still have intense vaginal irritation.  She has switched to all hypoallergenic products as well.               Medications:     Current Outpatient Medications Ordered in Epic   Medication     etonogestrel (NEXPLANON) 68 MG IMPL     metroNIDAZOLE (FLAGYL) 500 MG tablet     triamcinolone (KENALOG) 0.025 % external ointment     ALBUTEROL IN     No current Epic-ordered facility-administered medications on file.                Allergies:   Dust mites         Review of Systems:   The 5 point Review of Systems is negative other than noted in the HPI                     Physical Exam:   Blood pressure 113/62, pulse 85, height 1.753 m (5' 9\"), weight 113.2 kg (249 lb 8 oz), SpO2 98 %, unknown if currently breastfeeding.  Constitutional:   awake, alert, cooperative, no apparent distress, and appears stated age              Data:   All laboratory data reviewed           Assessment and Plan:   Vaginitis - kenalog ointment 2-3 times daily.  Follow up in 4 week.s       Cristin Tirado NP, CFNP  3/2/2023  10:15 AM  "

## 2023-03-28 ENCOUNTER — OFFICE VISIT (OUTPATIENT)
Dept: OBGYN | Facility: OTHER | Age: 28
End: 2023-03-28
Attending: NURSE PRACTITIONER
Payer: COMMERCIAL

## 2023-03-28 VITALS
OXYGEN SATURATION: 97 % | DIASTOLIC BLOOD PRESSURE: 67 MMHG | WEIGHT: 249 LBS | SYSTOLIC BLOOD PRESSURE: 112 MMHG | HEART RATE: 84 BPM | HEIGHT: 69 IN | BODY MASS INDEX: 36.88 KG/M2

## 2023-03-28 DIAGNOSIS — N89.8 VAGINAL ODOR: Primary | ICD-10-CM

## 2023-03-28 LAB
CLUE CELLS: NORMAL
TRICHOMONAS, WET PREP: NORMAL
WBC'S/HIGH POWER FIELD, WET PREP: NORMAL
YEAST, WET PREP: NORMAL

## 2023-03-28 PROCEDURE — 99212 OFFICE O/P EST SF 10 MIN: CPT | Performed by: NURSE PRACTITIONER

## 2023-03-28 PROCEDURE — 87210 SMEAR WET MOUNT SALINE/INK: CPT | Mod: ZL | Performed by: NURSE PRACTITIONER

## 2023-03-28 PROCEDURE — G0463 HOSPITAL OUTPT CLINIC VISIT: HCPCS | Mod: 25 | Performed by: COUNSELOR

## 2023-03-28 PROCEDURE — G0463 HOSPITAL OUTPT CLINIC VISIT: HCPCS | Performed by: COUNSELOR

## 2023-03-28 ASSESSMENT — PAIN SCALES - GENERAL: PAINLEVEL: NO PAIN (0)

## 2023-03-30 NOTE — PROGRESS NOTES
"[facility]                HPI   Her for follow upon recurrent vaginitis.  See prior note.  She has changed to all hypoallergenic products and has cut down to 1 cup of coffee daily.  She does still note vaginal odor but denies discharge or vaginal irritation.              Medications:     Current Outpatient Medications Ordered in Epic   Medication     etonogestrel (NEXPLANON) 68 MG IMPL     triamcinolone (KENALOG) 0.025 % external ointment     ALBUTEROL IN     No current Epic-ordered facility-administered medications on file.                Allergies:   Dust mites         Review of Systems:   The 5 point Review of Systems is negative other than noted in the HPI                     Physical Exam:   Blood pressure 112/67, pulse 84, height 1.753 m (5' 9\"), weight 112.9 kg (249 lb), SpO2 97 %, unknown if currently breastfeeding.  Constitutional:   awake, alert, cooperative, no apparent distress, and appears stated age     Genitounirinary:   External Genitalia:  General appearance; normal  Vagina:  Estrogen effect normal, Discharge absent, Lesions absent  Cervix:  Discharge absent, Tenderness absent              Data:     Results for orders placed or performed in visit on 03/28/23   Wet prep     Status: Normal    Specimen: Vagina; Swab   Result Value Ref Range    Trichomonas Absent Absent    Yeast Absent Absent    Clue Cells Absent Absent    WBCs/high power field None None               Assessment and Plan:   Recurrent vaginitis - no further infection identified.  Follow up as needed for any returning symptoms.      Cristin Tirado NP, CFNP  3/30/2023  1:28 PM  "

## 2023-04-29 ENCOUNTER — HEALTH MAINTENANCE LETTER (OUTPATIENT)
Age: 28
End: 2023-04-29

## 2023-07-09 ENCOUNTER — HOSPITAL ENCOUNTER (EMERGENCY)
Facility: HOSPITAL | Age: 28
Discharge: HOME | End: 2023-07-09
Attending: NURSE PRACTITIONER | Admitting: NURSE PRACTITIONER
Payer: COMMERCIAL

## 2023-07-09 VITALS
SYSTOLIC BLOOD PRESSURE: 117 MMHG | OXYGEN SATURATION: 97 % | DIASTOLIC BLOOD PRESSURE: 82 MMHG | HEART RATE: 81 BPM | TEMPERATURE: 98.1 F | RESPIRATION RATE: 18 BRPM

## 2023-07-09 DIAGNOSIS — H66.91 ACUTE RIGHT OTITIS MEDIA: Primary | ICD-10-CM

## 2023-07-09 DIAGNOSIS — J45.901 ACUTE ASTHMA EXACERBATION: ICD-10-CM

## 2023-07-09 DIAGNOSIS — J45.901 ASTHMA WITH ACUTE EXACERBATION, UNSPECIFIED ASTHMA SEVERITY, UNSPECIFIED WHETHER PERSISTENT: ICD-10-CM

## 2023-07-09 DIAGNOSIS — B34.9 VIRAL SYNDROME: ICD-10-CM

## 2023-07-09 LAB — GROUP A STREP BY PCR: NOT DETECTED

## 2023-07-09 PROCEDURE — 87651 STREP A DNA AMP PROBE: CPT | Performed by: NURSE PRACTITIONER

## 2023-07-09 PROCEDURE — G0463 HOSPITAL OUTPT CLINIC VISIT: HCPCS

## 2023-07-09 PROCEDURE — 99213 OFFICE O/P EST LOW 20 MIN: CPT | Performed by: NURSE PRACTITIONER

## 2023-07-09 RX ORDER — PREDNISONE 20 MG/1
TABLET ORAL
Qty: 10 TABLET | Refills: 0 | Status: ON HOLD | OUTPATIENT
Start: 2023-07-09 | End: 2024-07-29

## 2023-07-09 ASSESSMENT — ENCOUNTER SYMPTOMS
PSYCHIATRIC NEGATIVE: 1
SHORTNESS OF BREATH: 1
ABDOMINAL PAIN: 0
FEVER: 0
TROUBLE SWALLOWING: 0
CHILLS: 0
SORE THROAT: 1
VOMITING: 0
COUGH: 1
EYE REDNESS: 0
SINUS PAIN: 1
EYE DISCHARGE: 0
SINUS PRESSURE: 1
RHINORRHEA: 1
DIARRHEA: 0
HEADACHES: 0
NAUSEA: 0

## 2023-07-09 NOTE — Clinical Note
Frances Garcia was seen and treated in our emergency department on 7/9/2023.  She may return to work on 07/11/2023.       If you have any questions or concerns, please don't hesitate to call.      Lisa Fabian, NP

## 2023-07-09 NOTE — DISCHARGE INSTRUCTIONS
Augmentin as ordered  - Take entire course of antibiotic even if you start to feel better.  - Antibiotics can cause stomach upset including nausea and diarrhea. Read your bottle or ask the pharmacist if antibiotic can be taken with food to help prevent nausea. If you have symptoms of diarrhea you can take an over-the-counter probiotic and/or increase foods with probiotics such as yogurt, [place], sauerkraut.    Prednisone as ordered for asthma exacerbation    Push fluids    Alternate Tylenol and ibuprofen as needed for pain    Over-the-counter Flonase as needed for nasal congestion    Follow-up with primary care provider or return to urgent care/ED with any worsening in condition or additional concerns.

## 2023-07-09 NOTE — ED PROVIDER NOTES
History     Chief Complaint   Patient presents with     Pharyngitis     Otalgia     HPI  Frances Garcia is a 27 year old female who presents to urgent care today ambulatory with complaints of nasal congestion, right ear pain, rhinorrhea, sinus pain and pressure, sore throat, cough and shortness of breath.  Symptoms started 4 days ago and have been gradually worsening.  Patient has asthma and takes albuterol as needed, has been using albuterol more frequently.  Staying hydrated.  No rashes.  Has been taking Sudafed and pushing fluids.  No other concerns.    Allergies:  Allergies   Allergen Reactions     Dust Mites        Problem List:    There are no problems to display for this patient.       Past Medical History:    Past Medical History:   Diagnosis Date     Uncomplicated asthma        Past Surgical History:    Past Surgical History:   Procedure Laterality Date     ESOPHAGOSCOPY, GASTROSCOPY, DUODENOSCOPY (EGD), COMBINED N/A 11/6/2015    Procedure: COMBINED ESOPHAGOSCOPY, GASTROSCOPY, DUODENOSCOPY (EGD);  Surgeon: Gregory Boyce MD;  Location: [facility]       Family History:    No family history on file.    Social History:  Marital Status:  Single [1]  Social History     Tobacco Use     Smoking status: Never     Smokeless tobacco: Never   Substance Use Topics     Alcohol use: No     Drug use: No        Medications:    ALBUTEROL IN  amoxicillin-clavulanate (AUGMENTIN) 875-125 MG tablet  etonogestrel (NEXPLANON) 68 MG IMPL  predniSONE (DELTASONE) 20 MG tablet  triamcinolone (KENALOG) 0.025 % external ointment      Review of Systems   Constitutional: Negative for chills and fever.   HENT: Positive for congestion, ear pain (right), rhinorrhea, sinus pressure, sinus pain and sore throat. Negative for trouble swallowing.    Eyes: Negative for discharge and redness.   Respiratory: Positive for cough and shortness of breath.    Cardiovascular: Negative for chest pain.   Gastrointestinal: Negative for abdominal pain,  diarrhea, nausea and vomiting.   Musculoskeletal: Negative for gait problem.   Skin: Negative for rash.   Neurological: Negative for headaches.   Psychiatric/Behavioral: Negative.      Physical Exam   BP: 117/82  Pulse: 81  Temp: 98.1  F (36.7  C)  Resp: 18  SpO2: 97 %    Physical Exam  Vitals and nursing note reviewed.   Constitutional:       General: She is not in acute distress.     Appearance: She is well-developed. She is not ill-appearing or toxic-appearing.   HENT:      Right Ear: External ear normal. There is no impacted cerumen. Tympanic membrane is erythematous and bulging.      Left Ear: Tympanic membrane, ear canal and external ear normal. There is no impacted cerumen.      Nose: Congestion and rhinorrhea present.      Right Sinus: Maxillary sinus tenderness present. No frontal sinus tenderness.      Left Sinus: Maxillary sinus tenderness present. No frontal sinus tenderness.      Mouth/Throat:      Mouth: Mucous membranes are moist.      Pharynx: Oropharynx is clear. Posterior oropharyngeal erythema present. No oropharyngeal exudate.      Tonsils: No tonsillar exudate or tonsillar abscesses. 1+ on the right. 1+ on the left.   Cardiovascular:      Rate and Rhythm: Normal rate and regular rhythm.      Pulses: Normal pulses.      Heart sounds: Normal heart sounds.   Pulmonary:      Effort: Pulmonary effort is normal.      Breath sounds: Normal breath sounds.   Abdominal:      General: Bowel sounds are normal.      Palpations: Abdomen is soft.      Tenderness: There is no abdominal tenderness.   Musculoskeletal:      Cervical back: Normal range of motion and neck supple. No rigidity or tenderness.   Lymphadenopathy:      Cervical: No cervical adenopathy.   Neurological:      Mental Status: She is alert.   Psychiatric:         Mood and Affect: Mood normal.       ED Course     Results for orders placed or performed during the hospital encounter of 07/09/23 (from the past 24 hour(s))   Group A Streptococcus  PCR Throat Swab    Specimen: Throat; Swab   Result Value Ref Range    Group A strep by PCR Not Detected Not Detected    Narrative    The Xpert Xpress Strep A test, performed on the [employer], is a rapid, qualitative in vitro diagnostic test for the detection of Streptococcus pyogenes (Group A ß-hemolytic Streptococcus, Strep A) in throat swab specimens from patients with signs and symptoms of pharyngitis. The Xpert Xpress Strep A test can be used as an aid in the diagnosis of Group A Streptococcal pharyngitis. The assay is not intended to monitor treatment for Group A Streptococcus infections. The Xpert Xpress Strep A test utilizes an automated real-time polymerase chain reaction (PCR) to detect Streptococcus pyogenes DNA.       Medications - No data to display    Assessments & Plan (with Medical Decision Making)     I have reviewed the nursing notes.    I have reviewed the findings, diagnosis, plan and need for follow up with the patient.  (H66.91) Acute right otitis media  (primary encounter diagnosis)  (B34.9) Viral syndrome  (J45.901) Acute asthma exacerbation  Plan:  Patient ambulatory with a nontoxic appearance.  Patient's symptoms started as URI symptoms 4 days ago and have progressed into right otitis media and acute asthma exacerbation.  Lungs currently clear at this time.  Strep test is negative.  Has been attempting Sudafed at home and pushing fluids.  Staying hydrated.  No rashes.  No signs of pneumonia at this time.  Will treat right otitis media with Augmentin.  Has asthma, does not take a daily inhaler, has been using albuterol more frequently.  Will prescribe short course of prednisone for asthma exacerbation.  Patient may attempt over-the-counter Flonase as needed for nasal congestion.  Alternate Tylenol and ibuprofen as needed for pain.  Patient to follow-up with primary care provider or return to urgent care/ED with any worsening in condition or additional concerns.  Patient is  in agreement with treatment plan.    Discharge Medication List as of 7/9/2023 12:34 PM      START taking these medications    Details   amoxicillin-clavulanate (AUGMENTIN) 875-125 MG tablet Take 1 tablet by mouth 2 times daily for 10 days, Disp-20 tablet, R-0, E-Prescribe      predniSONE (DELTASONE) 20 MG tablet Take two tablets (= 40mg) each day for 5 (five) days, Disp-10 tablet, R-0, E-Prescribe           Final diagnoses:   Viral syndrome   Acute right otitis media   Acute asthma exacerbation     7/9/2023   [facility]     Lisa Fabian NP  07/09/23 0883

## 2023-07-09 NOTE — ED TRIAGE NOTES
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   Denies fevers at home, gi sx, gu sx  Therapies tried pushing fluids, all OTC remedies, sudafed this morning. No relief.    Jessa Behrman, PEDRON

## 2024-01-16 ENCOUNTER — TRANSFERRED RECORDS (OUTPATIENT)
Dept: HEALTH INFORMATION MANAGEMENT | Facility: HOSPITAL | Age: 29
End: 2024-01-16

## 2024-01-16 LAB
HIV 1&2 EXT: NORMAL
HPV ABSTRACT: NORMAL
PAP-ABSTRACT: NORMAL

## 2024-01-21 ENCOUNTER — HOSPITAL ENCOUNTER (EMERGENCY)
Facility: HOSPITAL | Age: 29
Discharge: HOME | End: 2024-01-21
Attending: PHYSICIAN ASSISTANT | Admitting: PHYSICIAN ASSISTANT
Payer: COMMERCIAL

## 2024-01-21 ENCOUNTER — APPOINTMENT (OUTPATIENT)
Dept: GENERAL RADIOLOGY | Facility: HOSPITAL | Age: 29
End: 2024-01-21
Attending: PHYSICIAN ASSISTANT
Payer: COMMERCIAL

## 2024-01-21 VITALS
RESPIRATION RATE: 16 BRPM | WEIGHT: 248.9 LBS | DIASTOLIC BLOOD PRESSURE: 59 MMHG | OXYGEN SATURATION: 97 % | HEIGHT: 71 IN | HEART RATE: 88 BPM | SYSTOLIC BLOOD PRESSURE: 130 MMHG | BODY MASS INDEX: 34.85 KG/M2 | TEMPERATURE: 97.8 F

## 2024-01-21 DIAGNOSIS — S93.401A SPRAIN OF RIGHT ANKLE, UNSPECIFIED LIGAMENT, INITIAL ENCOUNTER: ICD-10-CM

## 2024-01-21 DIAGNOSIS — S82.61XA CLOSED AVULSION FRACTURE OF LATERAL MALLEOLUS OF RIGHT FIBULA, INITIAL ENCOUNTER: ICD-10-CM

## 2024-01-21 PROCEDURE — 99213 OFFICE O/P EST LOW 20 MIN: CPT | Performed by: PHYSICIAN ASSISTANT

## 2024-01-21 PROCEDURE — G0463 HOSPITAL OUTPT CLINIC VISIT: HCPCS

## 2024-01-21 PROCEDURE — G0463 HOSPITAL OUTPT CLINIC VISIT: HCPCS | Mod: 25

## 2024-01-21 PROCEDURE — 73610 X-RAY EXAM OF ANKLE: CPT | Mod: RT

## 2024-01-21 RX ORDER — LEVONORGESTREL AND ETHINYL ESTRADIOL 0.1-0.02MG
KIT ORAL
Status: ON HOLD | COMMUNITY
Start: 2023-08-16 | End: 2024-07-29

## 2024-01-21 RX ORDER — MICONAZOLE NITRATE 2 %
1 CREAM WITH APPLICATOR VAGINAL
COMMUNITY
Start: 2024-01-17 | End: 2024-01-24

## 2024-01-21 RX ORDER — ASPIRIN 81 MG/1
81 TABLET ORAL
Status: ON HOLD | COMMUNITY
Start: 2024-01-27 | End: 2024-07-29

## 2024-01-21 ASSESSMENT — ACTIVITIES OF DAILY LIVING (ADL): ADLS_ACUITY_SCORE: 35

## 2024-01-21 NOTE — Clinical Note
Frances Garcia was seen and treated in our emergency department on 1/21/2024.  She may return to work on 01/23/2024.  No work tomorrow due to injury.      If you have any questions or concerns, please don't hesitate to call.      Johanne Acosta PA-C

## 2024-01-21 NOTE — DISCHARGE INSTRUCTIONS
Wear the walking boot for the next 1-2 weeks, then as needed.   Follow up with primary care in 1-2 weeks for re-check.  Rest the affected painful area as much as possible.  Apply ice for 15-20 minutes intermittently as needed and especially after any offending activity. Daily stretching.  As pain recedes, begin normal activities slowly as tolerated.    Return here with any new or worsening symptoms.

## 2024-01-21 NOTE — ED PROVIDER NOTES
"  History     Chief Complaint   Patient presents with    Ankle Pain     Right ankle pain     HPI  Frances Garcia is a 28 year old female who presents with right lateral ankle pain and swelling after turning her ankle last night when she slipping on ice. She has significant swelling to the lateral ankle and states she heard a \"crack\" when she did it. She has been bearing weight but did start to use home crutches today due to pain.     Allergies:  Allergies   Allergen Reactions    Dust Mites        Problem List:    There are no problems to display for this patient.       Past Medical History:    Past Medical History:   Diagnosis Date    Uncomplicated asthma        Past Surgical History:    Past Surgical History:   Procedure Laterality Date    ESOPHAGOSCOPY, GASTROSCOPY, DUODENOSCOPY (EGD), COMBINED N/A 11/6/2015    Procedure: COMBINED ESOPHAGOSCOPY, GASTROSCOPY, DUODENOSCOPY (EGD);  Surgeon: Gregory Boyce MD;  Location: [facility]       Family History:    No family history on file.    Social History:  Marital Status:  Single [1]  Social History     Tobacco Use    Smoking status: Never    Smokeless tobacco: Never   Substance Use Topics    Alcohol use: No    Drug use: No        Medications:    ALBUTEROL IN  [START ON 1/27/2024] aspirin 81 MG EC tablet  etonogestrel (NEXPLANON) 68 MG IMPL  FALMINA 0.1-20 MG-MCG tablet  miconazole (MICATIN) 2 % cream  predniSONE (DELTASONE) 20 MG tablet  Prenatal MV & Min w/FA-DHA (PRENATAL GUMMIES) 0.18-25 MG CHEW  triamcinolone (KENALOG) 0.025 % external ointment          Review of Systems   All other systems reviewed and are negative.      Physical Exam   BP: 130/59  Pulse: 88  Temp: 97.8  F (36.6  C)  Resp: 16  Height: 180.3 cm (5' 11\")  Weight: 112.9 kg (248 lb 14.4 oz)  SpO2: 97 %      Physical Exam  Vitals and nursing note reviewed.   Constitutional:       Appearance: Normal appearance.   Cardiovascular:      Rate and Rhythm: Normal rate.      Pulses: Normal pulses.   Pulmonary: "      Effort: Pulmonary effort is normal.   Musculoskeletal:      Right lower leg: Normal.      Right ankle: Swelling present. No deformity, ecchymosis or lacerations. Tenderness present over the lateral malleolus and ATF ligament. Normal range of motion. Anterior drawer test negative. Normal pulse.      Right Achilles Tendon: Normal.      Right foot: Normal.   Skin:     General: Skin is warm.      Capillary Refill: Capillary refill takes less than 2 seconds.   Neurological:      Mental Status: She is alert.         ED Course                 Procedures                Results for orders placed or performed during the hospital encounter of 01/21/24 (from the past 24 hour(s))   Ankle XR, G/E 3 views, right    Narrative    Exam: XR ANKLE RIGHT G/E 3 VIEWS    Technique: Right ankle, 3 Views    Comparison: None.    Exam reason: twisted right ankle yesterday, lateral ankle pain today    Findings:  There is a somewhat crescentic osseous fragment at the inferior aspect  of the lateral malleolus, likely a small avulsion fracture. The ankle  mortise appears intact.    There is soft tissue swelling about the ankle, worse laterally.       Impression    Impression:  Crescentic osseous fragment at the inferior aspect of the lateral  malleolus, likely a small avulsion fracture.    VARUN WILKINSON MD         SYSTEM ID:  [id number]       Medications - No data to display    Assessments & Plan (with Medical Decision Making)   Pt has a severe right ankle sprain with small avulsion fracture of the lateral malleolus. She was placed into a walking boot with good pain relief following. CMS intact pre and post. She will continue using her home crutches for the next week. She will follow up with primary care in 1 week to ensure she is healing well and doesn't require ortho referral. She was discharged home following in stable condition.     Plan: Wear the walking boot for the next 1-2 weeks, then as needed.   Follow up with primary care in 1-2  weeks for re-check.  Rest the affected painful area as much as possible.  Apply ice for 15-20 minutes intermittently as needed and especially after any offending activity. Daily stretching.  As pain recedes, begin normal activities slowly as tolerated.    Return here with any new or worsening symptoms.       I have reviewed the nursing notes.    I have reviewed the findings, diagnosis, plan and need for follow up with the patient.      Discharge Medication List as of 1/21/2024  1:25 PM          Final diagnoses:   Closed avulsion fracture of lateral malleolus of right fibula, initial encounter   Sprain of right ankle, unspecified ligament, initial encounter       1/21/2024   [facility]

## 2024-01-21 NOTE — ED TRIAGE NOTES
Pt presents with c/o right ankle pain. Reports she was going down some steps at a friends house and twisted her ankle. Incident happened yesterday at 2000pm slipped on the ice. CMS intact. Limited ROM. Pt wheeled in w/c by spouse. Bruising visualized. Pt took tylenol.

## 2024-06-14 ENCOUNTER — TRANSFERRED RECORDS (OUTPATIENT)
Dept: HEALTH INFORMATION MANAGEMENT | Facility: CLINIC | Age: 29
End: 2024-06-14

## 2024-07-06 ENCOUNTER — HEALTH MAINTENANCE LETTER (OUTPATIENT)
Age: 29
End: 2024-07-06

## 2024-07-13 ENCOUNTER — HOSPITAL ENCOUNTER (OUTPATIENT)
Facility: HOSPITAL | Age: 29
Discharge: HOME | End: 2024-07-14
Attending: FAMILY MEDICINE | Admitting: FAMILY MEDICINE
Payer: MEDICAID

## 2024-07-13 PROBLEM — Z36.89 ENCOUNTER FOR TRIAGE IN PREGNANT PATIENT: Status: ACTIVE | Noted: 2024-07-13

## 2024-07-13 RX ORDER — LIDOCAINE 40 MG/G
CREAM TOPICAL
Status: DISCONTINUED | OUTPATIENT
Start: 2024-07-13 | End: 2024-07-14 | Stop reason: HOSPADM

## 2024-07-14 VITALS
RESPIRATION RATE: 18 BRPM | OXYGEN SATURATION: 97 % | HEART RATE: 111 BPM | SYSTOLIC BLOOD PRESSURE: 118 MMHG | TEMPERATURE: 98.1 F | DIASTOLIC BLOOD PRESSURE: 62 MMHG

## 2024-07-14 LAB
ALBUMIN MFR UR ELPH: 40.2 MG/DL
ALBUMIN SERPL BCG-MCNC: 3.1 G/DL (ref 3.5–5.2)
ALP SERPL-CCNC: 111 U/L (ref 40–150)
ALT SERPL W P-5'-P-CCNC: 9 U/L (ref 0–50)
ANION GAP SERPL CALCULATED.3IONS-SCNC: 14 MMOL/L (ref 7–15)
AST SERPL W P-5'-P-CCNC: 13 U/L (ref 0–45)
BASOPHILS # BLD AUTO: 0.1 10E3/UL (ref 0–0.2)
BASOPHILS NFR BLD AUTO: 0 %
BILIRUB SERPL-MCNC: 0.9 MG/DL
BUN SERPL-MCNC: 8.5 MG/DL (ref 6–20)
CALCIUM SERPL-MCNC: 8.8 MG/DL (ref 8.6–10)
CHLORIDE SERPL-SCNC: 104 MMOL/L (ref 98–107)
CREAT SERPL-MCNC: 0.54 MG/DL (ref 0.51–0.95)
CREAT UR-MCNC: 324.4 MG/DL
DEPRECATED HCO3 PLAS-SCNC: 17 MMOL/L (ref 22–29)
EGFRCR SERPLBLD CKD-EPI 2021: >90 ML/MIN/1.73M2
EOSINOPHIL # BLD AUTO: 0.1 10E3/UL (ref 0–0.7)
EOSINOPHIL NFR BLD AUTO: 1 %
ERYTHROCYTE [DISTWIDTH] IN BLOOD BY AUTOMATED COUNT: 12.3 % (ref 10–15)
GLUCOSE SERPL-MCNC: 119 MG/DL (ref 70–99)
HCT VFR BLD AUTO: 34.8 % (ref 35–47)
HGB BLD-MCNC: 12.4 G/DL (ref 11.7–15.7)
HOLD SPECIMEN: NORMAL
HOLD SPECIMEN: NORMAL
IMM GRANULOCYTES # BLD: 0.2 10E3/UL
IMM GRANULOCYTES NFR BLD: 1 %
LYMPHOCYTES # BLD AUTO: 1.9 10E3/UL (ref 0.8–5.3)
LYMPHOCYTES NFR BLD AUTO: 14 %
MCH RBC QN AUTO: 30.8 PG (ref 26.5–33)
MCHC RBC AUTO-ENTMCNC: 35.6 G/DL (ref 31.5–36.5)
MCV RBC AUTO: 87 FL (ref 78–100)
MONOCYTES # BLD AUTO: 0.9 10E3/UL (ref 0–1.3)
MONOCYTES NFR BLD AUTO: 7 %
NEUTROPHILS # BLD AUTO: 10.5 10E3/UL (ref 1.6–8.3)
NEUTROPHILS NFR BLD AUTO: 77 %
NRBC # BLD AUTO: 0 10E3/UL
NRBC BLD AUTO-RTO: 0 /100
PLATELET # BLD AUTO: 194 10E3/UL (ref 150–450)
POTASSIUM SERPL-SCNC: 3.9 MMOL/L (ref 3.4–5.3)
PROT SERPL-MCNC: 6.1 G/DL (ref 6.4–8.3)
PROT/CREAT 24H UR: 0.12 MG/MG CR (ref 0–0.2)
RBC # BLD AUTO: 4.02 10E6/UL (ref 3.8–5.2)
SODIUM SERPL-SCNC: 135 MMOL/L (ref 135–145)
URATE SERPL-MCNC: 5.5 MG/DL (ref 2.4–5.7)
WBC # BLD AUTO: 13.6 10E3/UL (ref 4–11)

## 2024-07-14 PROCEDURE — 84550 ASSAY OF BLOOD/URIC ACID: CPT | Performed by: FAMILY MEDICINE

## 2024-07-14 PROCEDURE — 36415 COLL VENOUS BLD VENIPUNCTURE: CPT | Performed by: FAMILY MEDICINE

## 2024-07-14 PROCEDURE — 84156 ASSAY OF PROTEIN URINE: CPT | Performed by: FAMILY MEDICINE

## 2024-07-14 PROCEDURE — G0463 HOSPITAL OUTPT CLINIC VISIT: HCPCS

## 2024-07-14 PROCEDURE — 85025 COMPLETE CBC W/AUTO DIFF WBC: CPT | Performed by: FAMILY MEDICINE

## 2024-07-14 PROCEDURE — 80053 COMPREHEN METABOLIC PANEL: CPT | Performed by: FAMILY MEDICINE

## 2024-07-14 ASSESSMENT — ACTIVITIES OF DAILY LIVING (ADL): ADLS_ACUITY_SCORE: 35

## 2024-07-14 NOTE — PLAN OF CARE
Data: Patient assessed in the [facility] for  rule out pre-eclampsia . Cervical exam deferred. Membranes intact. Contractions are present. Contactions are irregular, 2-15 minutes apart, and last  seconds. Uterine assessment is other (not palpable, pt denies feeling contractions) during contractions and soft by palpation at rest. See flowsheets for fetal assessment documentation.     Action: Presumed adequate fetal oxygenation documented. Discharge instructions reviewed. Patient instructed to report change in fetal movement, vaginal leaking of fluid or bleeding, abdominal pain, or any concerns related to the pregnancy to provider/clinic.      Response: Orders to discharge home per Dr. Thomas with plan for patient to follow up in clinic, decrease salt intake, increase oral fluids, and wear compression socks. Patient verbalized understanding of education and agreement with plan. Discharged to home at 0144.

## 2024-07-14 NOTE — DISCHARGE INSTRUCTIONS
Discharge Instructions for Undelivered Patients    Diet:  * Drink 8 to 12 glasses of liquids (milk, juice, water) every day  * You may eat meals and snacks.  **** Decrease salt intake, increase water!    Activity:  * Count fetal kicks every day.  * Call your doctor if your baby is moving less than usual.    Call your provider if you notice:  * Swelling in your face or increased swelling in your hands or legs.  * Headaches that are not relieved by Tylenol (acetaminophen).  * Changes in your vision (blurring; seeing spots or stars).  * Nausea (sick to your stomach) and vomiting (throwing up).  * Weight gain of 5 pounds per week.  * Heartburn that doesn't go away.  * Signs of bladder infection: Pain when you urinate (use the toilet), needing to go more often or more urgently.  * The bag of martinez (membrane) breaks, or you notice leaking in your underwear.  * Bright red blood in your underwear.  * Abdominal (lower belly) or stomach pain.  * For first baby: Contractions (tightenings) less than 5 minutes apart for one hour or more.  * Second (plus) baby: Contractions (tightenings) less than 10 minutes apart and getting stronger.  * Increase or change in vaginal discharge (note the color and amount).      [facility]: [phone]

## 2024-07-14 NOTE — PLAN OF CARE
Data: Patient presented to [facility]: 2024 11:19 PM.  Reason for maternal/fetal assessment is  rule out pre-eclampsia . Patient reports swollen legs, dizziness when standing, and epigastric pain starting around one month ago. Patient denies uterine contractions, leaking of vaginal fluid/rupture of membranes, vaginal bleeding, abdominal pain, pelvic pressure, nausea, vomiting, headache. Patient reports fetal movement is normal. Patient is a 36w0d .  Prenatal record reviewed. Pregnancy has been uncomplicated.    Vital signs wnl. Support person is not present.     Action: Verbal consent for EFM. Triage assessment completed.     Response: Patient verbalized agreement with plan. Dr. Thomas called and notified of 4+ pitting edema in bilateral lower extremities, redness to medial thighs, epigastric pain, dizziness, leg pain, tachycardia, and family history. Labs ordered, see record. Plan to notify MD with results.

## 2024-07-26 ENCOUNTER — HOSPITAL ENCOUNTER (INPATIENT)
Facility: HOSPITAL | Age: 29
LOS: 3 days | Discharge: HOME | End: 2024-07-29
Attending: FAMILY MEDICINE | Admitting: FAMILY MEDICINE
Payer: MEDICAID

## 2024-07-26 DIAGNOSIS — Z37.9 NORMAL LABOR: Primary | ICD-10-CM

## 2024-07-26 PROBLEM — O42.90 PROM (PREMATURE RUPTURE OF MEMBRANES): Status: ACTIVE | Noted: 2024-07-26

## 2024-07-26 LAB
ABO/RH(D): NORMAL
ANTIBODY SCREEN: NEGATIVE
ERYTHROCYTE [DISTWIDTH] IN BLOOD BY AUTOMATED COUNT: 12.5 % (ref 10–15)
HCT VFR BLD AUTO: 33.1 % (ref 35–47)
HGB BLD-MCNC: 11.6 G/DL (ref 11.7–15.7)
HOLD SPECIMEN: NORMAL
MCH RBC QN AUTO: 30.5 PG (ref 26.5–33)
MCHC RBC AUTO-ENTMCNC: 35 G/DL (ref 31.5–36.5)
MCV RBC AUTO: 87 FL (ref 78–100)
PLATELET # BLD AUTO: 204 10E3/UL (ref 150–450)
RBC # BLD AUTO: 3.8 10E6/UL (ref 3.8–5.2)
RUPTURE OF FETAL MEMBRANES BY ROM PLUS: POSITIVE
SPECIMEN EXPIRATION DATE: NORMAL
WBC # BLD AUTO: 11.1 10E3/UL (ref 4–11)

## 2024-07-26 PROCEDURE — 120N000001 HC R&B MED SURG/OB

## 2024-07-26 PROCEDURE — 86900 BLOOD TYPING SEROLOGIC ABO: CPT | Performed by: FAMILY MEDICINE

## 2024-07-26 PROCEDURE — 258N000003 HC RX IP 258 OP 636: Performed by: FAMILY MEDICINE

## 2024-07-26 PROCEDURE — 84112 EVAL AMNIOTIC FLUID PROTEIN: CPT | Performed by: FAMILY MEDICINE

## 2024-07-26 PROCEDURE — 3E033VJ INTRODUCTION OF OTHER HORMONE INTO PERIPHERAL VEIN, PERCUTANEOUS APPROACH: ICD-10-PCS | Performed by: FAMILY MEDICINE

## 2024-07-26 PROCEDURE — 250N000011 HC RX IP 250 OP 636: Performed by: FAMILY MEDICINE

## 2024-07-26 PROCEDURE — 250N000009 HC RX 250: Performed by: FAMILY MEDICINE

## 2024-07-26 PROCEDURE — 85027 COMPLETE CBC AUTOMATED: CPT | Performed by: FAMILY MEDICINE

## 2024-07-26 PROCEDURE — 36415 COLL VENOUS BLD VENIPUNCTURE: CPT | Performed by: FAMILY MEDICINE

## 2024-07-26 PROCEDURE — 86780 TREPONEMA PALLIDUM: CPT | Performed by: FAMILY MEDICINE

## 2024-07-26 RX ORDER — METOCLOPRAMIDE 10 MG/1
10 TABLET ORAL EVERY 6 HOURS PRN
Status: DISCONTINUED | OUTPATIENT
Start: 2024-07-26 | End: 2024-07-28 | Stop reason: HOSPADM

## 2024-07-26 RX ORDER — TRANEXAMIC ACID 10 MG/ML
1 INJECTION, SOLUTION INTRAVENOUS EVERY 30 MIN PRN
Status: DISCONTINUED | OUTPATIENT
Start: 2024-07-26 | End: 2024-07-28 | Stop reason: HOSPADM

## 2024-07-26 RX ORDER — OXYTOCIN 10 [USP'U]/ML
10 INJECTION, SOLUTION INTRAMUSCULAR; INTRAVENOUS
Status: DISCONTINUED | OUTPATIENT
Start: 2024-07-26 | End: 2024-07-28 | Stop reason: HOSPADM

## 2024-07-26 RX ORDER — LIDOCAINE 40 MG/G
CREAM TOPICAL
Status: DISCONTINUED | OUTPATIENT
Start: 2024-07-26 | End: 2024-07-28 | Stop reason: HOSPADM

## 2024-07-26 RX ORDER — OXYTOCIN/0.9 % SODIUM CHLORIDE 30/500 ML
100-340 PLASTIC BAG, INJECTION (ML) INTRAVENOUS CONTINUOUS PRN
Status: DISCONTINUED | OUTPATIENT
Start: 2024-07-26 | End: 2024-07-29 | Stop reason: HOSPADM

## 2024-07-26 RX ORDER — NALOXONE HYDROCHLORIDE 0.4 MG/ML
0.4 INJECTION, SOLUTION INTRAMUSCULAR; INTRAVENOUS; SUBCUTANEOUS
Status: DISCONTINUED | OUTPATIENT
Start: 2024-07-26 | End: 2024-07-28 | Stop reason: HOSPADM

## 2024-07-26 RX ORDER — KETOROLAC TROMETHAMINE 30 MG/ML
30 INJECTION, SOLUTION INTRAMUSCULAR; INTRAVENOUS
Status: COMPLETED | OUTPATIENT
Start: 2024-07-26 | End: 2024-07-28

## 2024-07-26 RX ORDER — OXYTOCIN/0.9 % SODIUM CHLORIDE 30/500 ML
340 PLASTIC BAG, INJECTION (ML) INTRAVENOUS CONTINUOUS PRN
Status: DISCONTINUED | OUTPATIENT
Start: 2024-07-26 | End: 2024-07-28 | Stop reason: HOSPADM

## 2024-07-26 RX ORDER — PENICILLIN G 3000000 [IU]/50ML
3 INJECTION, SOLUTION INTRAVENOUS EVERY 4 HOURS
Status: DISCONTINUED | OUTPATIENT
Start: 2024-07-26 | End: 2024-07-28 | Stop reason: HOSPADM

## 2024-07-26 RX ORDER — CARBOPROST TROMETHAMINE 250 UG/ML
250 INJECTION, SOLUTION INTRAMUSCULAR
Status: DISCONTINUED | OUTPATIENT
Start: 2024-07-26 | End: 2024-07-28 | Stop reason: HOSPADM

## 2024-07-26 RX ORDER — OXYTOCIN/0.9 % SODIUM CHLORIDE 30/500 ML
1-30 PLASTIC BAG, INJECTION (ML) INTRAVENOUS CONTINUOUS
Status: DISCONTINUED | OUTPATIENT
Start: 2024-07-26 | End: 2024-07-28 | Stop reason: HOSPADM

## 2024-07-26 RX ORDER — METHYLERGONOVINE MALEATE 0.2 MG/ML
200 INJECTION INTRAVENOUS
Status: DISCONTINUED | OUTPATIENT
Start: 2024-07-26 | End: 2024-07-28 | Stop reason: HOSPADM

## 2024-07-26 RX ORDER — ONDANSETRON 2 MG/ML
4 INJECTION INTRAMUSCULAR; INTRAVENOUS EVERY 6 HOURS PRN
Status: DISCONTINUED | OUTPATIENT
Start: 2024-07-26 | End: 2024-07-28 | Stop reason: HOSPADM

## 2024-07-26 RX ORDER — LIDOCAINE 40 MG/G
CREAM TOPICAL
Status: DISCONTINUED | OUTPATIENT
Start: 2024-07-26 | End: 2024-07-26 | Stop reason: HOSPADM

## 2024-07-26 RX ORDER — LOPERAMIDE HCL 2 MG
2 CAPSULE ORAL
Status: DISCONTINUED | OUTPATIENT
Start: 2024-07-26 | End: 2024-07-28 | Stop reason: HOSPADM

## 2024-07-26 RX ORDER — SODIUM CHLORIDE, SODIUM LACTATE, POTASSIUM CHLORIDE, CALCIUM CHLORIDE 600; 310; 30; 20 MG/100ML; MG/100ML; MG/100ML; MG/100ML
INJECTION, SOLUTION INTRAVENOUS CONTINUOUS PRN
Status: DISCONTINUED | OUTPATIENT
Start: 2024-07-26 | End: 2024-07-28 | Stop reason: HOSPADM

## 2024-07-26 RX ORDER — PROCHLORPERAZINE 25 MG
25 SUPPOSITORY, RECTAL RECTAL EVERY 12 HOURS PRN
Status: DISCONTINUED | OUTPATIENT
Start: 2024-07-26 | End: 2024-07-28 | Stop reason: HOSPADM

## 2024-07-26 RX ORDER — NALOXONE HYDROCHLORIDE 0.4 MG/ML
0.2 INJECTION, SOLUTION INTRAMUSCULAR; INTRAVENOUS; SUBCUTANEOUS
Status: DISCONTINUED | OUTPATIENT
Start: 2024-07-26 | End: 2024-07-28 | Stop reason: HOSPADM

## 2024-07-26 RX ORDER — METOCLOPRAMIDE HYDROCHLORIDE 5 MG/ML
10 INJECTION INTRAMUSCULAR; INTRAVENOUS EVERY 6 HOURS PRN
Status: DISCONTINUED | OUTPATIENT
Start: 2024-07-26 | End: 2024-07-28 | Stop reason: HOSPADM

## 2024-07-26 RX ORDER — VITAMIN A ACETATE, .BETA.-CAROTENE, ASCORBIC ACID, CHOLECALCIFEROL, .ALPHA.-TOCOPHEROL ACETATE, DL-, THIAMINE MONONITRATE, RIBOFLAVIN, NIACINAMIDE, PYRIDOXINE HYDROCHLORIDE, FOLIC ACID, CYANOCOBALAMIN, CALCIUM CARBONATE, FERROUS FUMARATE, ZINC OXIDE, AND CUPRIC OXIDE 2000; 2000; 120; 400; 22; 1.84; 3; 20; 10; 1; 12; 200; 27; 25; 2 [IU]/1; [IU]/1; MG/1; [IU]/1; MG/1; MG/1; MG/1; MG/1; MG/1; MG/1; UG/1; MG/1; MG/1; MG/1; MG/1
1 TABLET ORAL DAILY
Status: DISCONTINUED | OUTPATIENT
Start: 2024-07-27 | End: 2024-07-29 | Stop reason: HOSPADM

## 2024-07-26 RX ORDER — MISOPROSTOL 200 UG/1
400 TABLET ORAL
Status: DISCONTINUED | OUTPATIENT
Start: 2024-07-26 | End: 2024-07-28 | Stop reason: HOSPADM

## 2024-07-26 RX ORDER — OXYTOCIN 10 [USP'U]/ML
10 INJECTION, SOLUTION INTRAMUSCULAR; INTRAVENOUS
Status: DISCONTINUED | OUTPATIENT
Start: 2024-07-26 | End: 2024-07-29 | Stop reason: HOSPADM

## 2024-07-26 RX ORDER — CITRIC ACID/SODIUM CITRATE 334-500MG
30 SOLUTION, ORAL ORAL
Status: DISCONTINUED | OUTPATIENT
Start: 2024-07-26 | End: 2024-07-28 | Stop reason: HOSPADM

## 2024-07-26 RX ORDER — ONDANSETRON 4 MG/1
4 TABLET, ORALLY DISINTEGRATING ORAL EVERY 6 HOURS PRN
Status: DISCONTINUED | OUTPATIENT
Start: 2024-07-26 | End: 2024-07-28 | Stop reason: HOSPADM

## 2024-07-26 RX ORDER — PROCHLORPERAZINE MALEATE 10 MG
10 TABLET ORAL EVERY 6 HOURS PRN
Status: DISCONTINUED | OUTPATIENT
Start: 2024-07-26 | End: 2024-07-28 | Stop reason: HOSPADM

## 2024-07-26 RX ORDER — LOPERAMIDE HCL 2 MG
4 CAPSULE ORAL
Status: DISCONTINUED | OUTPATIENT
Start: 2024-07-26 | End: 2024-07-28 | Stop reason: HOSPADM

## 2024-07-26 RX ORDER — PENICILLIN G POTASSIUM 5000000 [IU]/1
5 INJECTION, POWDER, FOR SOLUTION INTRAMUSCULAR; INTRAVENOUS ONCE
Status: COMPLETED | OUTPATIENT
Start: 2024-07-26 | End: 2024-07-26

## 2024-07-26 RX ORDER — FENTANYL CITRATE 50 UG/ML
50 INJECTION, SOLUTION INTRAMUSCULAR; INTRAVENOUS EVERY 30 MIN PRN
Status: DISCONTINUED | OUTPATIENT
Start: 2024-07-26 | End: 2024-07-28 | Stop reason: HOSPADM

## 2024-07-26 RX ORDER — IBUPROFEN 800 MG/1
800 TABLET, FILM COATED ORAL
Status: COMPLETED | OUTPATIENT
Start: 2024-07-26 | End: 2024-07-28

## 2024-07-26 RX ADMIN — SODIUM CHLORIDE, POTASSIUM CHLORIDE, SODIUM LACTATE AND CALCIUM CHLORIDE: 600; 310; 30; 20 INJECTION, SOLUTION INTRAVENOUS at 18:19

## 2024-07-26 RX ADMIN — Medication 2 MILLI-UNITS/MIN: at 18:24

## 2024-07-26 RX ADMIN — PENICILLIN G POTASSIUM 5 MILLION UNITS: 5000000 POWDER, FOR SOLUTION INTRAMUSCULAR; INTRAPLEURAL; INTRATHECAL; INTRAVENOUS at 18:19

## 2024-07-26 RX ADMIN — PENICILLIN G 3 MILLION UNITS: 3000000 INJECTION, SOLUTION INTRAVENOUS at 22:16

## 2024-07-26 ASSESSMENT — ACTIVITIES OF DAILY LIVING (ADL)
ADLS_ACUITY_SCORE: 20

## 2024-07-26 NOTE — H&P
[facility]    History and Physical  Obstetrics and Gynecology     Date of Admission:  2024  Date of Service (when I saw the patient): 24    Assessment & Plan   Frances Garcia is a 28 year old  female who presents at 37.6wga with term ROM at 1530 today.  ASSESSMENT:   IUP @ 37w6d ruptured with no labor.  NST reactive.  Category  I    PLAN:   Admit - see IP orders  Labor induction with Pitocin for term PROM  Pain medication: nitrous oxide prn, fentanyl prn, epidural as desired as labor progresses  Prophylactic antibiotic for + GBS status  Anticipate     Kathy Thomas MD    History of Present Illness   Frances Garcia is a 28 year old female  37w6d  Estimated Date of Delivery: Aug 10, 2024 is calculated from Patient's last menstrual period was 2023 (exact date). is [facility]  ruptured with no labor. She reports her water broke at approximately 1530 today. Has not had contractions, vaginal bleeding, and reports + fetal movement.    PRENATAL COURSE  Prenatal course was complicated by a fetal choroid plexus cyst and possible VSD, however detailed MFM US showed no CPC and no VSD. She is GBS positive, and was ASA 81mg daily for preeclampsia risk factors. Also noted to have significant lower extremity edema in the past few weeks, dopplers negative for DVT.      Recent Labs   Lab Test 16  0307   ABO A   RH  Pos   AS Neg     Rhogam not indicated   No lab results found.  Prenatal labs: A+, antibody screen negative, HBsAg neg, RPR nonreactive, HIV nonreactive, Rubella immune, GBS positive    Past Medical History    I have reviewed this patient's medical history and updated it with pertinent information if needed.   Past Medical History:   Diagnosis Date    Migraine     Uncomplicated asthma        Past Surgical History   I have reviewed this patient's surgical history and updated it with pertinent information if needed.  Past Surgical History:    Procedure Laterality Date    ESOPHAGOSCOPY, GASTROSCOPY, DUODENOSCOPY (EGD), COMBINED N/A 11/6/2015    Procedure: COMBINED ESOPHAGOSCOPY, GASTROSCOPY, DUODENOSCOPY (EGD);  Surgeon: Gregory Boyce MD;  Location: [facility]       Prior to Admission Medications   Prior to Admission Medications   Prescriptions Last Dose Informant Patient Reported? Taking?   ALBUTEROL IN More than a month  Yes Yes   Sig: Inhale into the lungs as needed   FALMINA 0.1-20 MG-MCG tablet More than a month  Yes Yes   Prenatal MV & Min w/FA-DHA (PRENATAL GUMMIES) 0.18-25 MG CHEW 7/26/2024  Yes Yes   Sig: Take 1 Gummy by mouth one time a day.   aspirin 81 MG EC tablet 7/25/2024  Yes Yes   Sig: Take 81 mg by mouth   predniSONE (DELTASONE) 20 MG tablet More than a month  No Yes   Sig: Take two tablets (= 40mg) each day for 5 (five) days   triamcinolone (KENALOG) 0.025 % external ointment More than a month  No Yes   Sig: Apply topically 2 times daily      Facility-Administered Medications: None     Allergies   Allergies   Allergen Reactions    Dust Mites        Social History   I have reviewed this patient's social history and updated it with pertinent information if needed. Frances Garcia  reports that she has never smoked. She has never used smokeless tobacco. She reports that she does not drink alcohol and does not use drugs.    Family History   I have reviewed this patient's family history and updated it with pertinent information if needed.   No family history on file.    Immunization History   Immunizations are up to date    Physical Exam   Temp: 98.2  F (36.8  C) Temp src: Oral BP: 124/79 Pulse: 110   Resp: 18 SpO2: 97 % O2 Device: None (Room air)    Vital Signs with Ranges  Temp:  [98.2  F (36.8  C)] 98.2  F (36.8  C)  Pulse:  [110] 110  Resp:  [18] 18  BP: (124)/(79) 124/79  SpO2:  [97 %] 97 %    Abdomen: gravid, single vertex fetus, non-tender, EFW 8 lbs   Cervical Exam: 1.5// Posterior/ firm/ -3  per RN check    Fetal Heart Tones:  140-145 baseline, moderate variablility, + accels, no decels, and Category I  TOCO:   external monitor and frequency rare    Constitutional: healthy, alert, and active   Respiratory: No increased work of breathing, good air exchange, clear to auscultation bilaterally, no crackles or wheezing  Cardiovascular: Normal apical impulse, regular rate and rhythm, normal S1 and S2, no S3 or S4, and no murmur noted  Skin/Extremites: 2+ BLE edema and normal skin color, texture, turgor  Neurologic: Awake, alert, oriented to name, place and time.  Cranial nerves II-XII are grossly intact.  Motor is 5 out of 5 bilaterally.  Cerebellar finger to nose, heel to shin intact.  Sensory is intact.  Babinski down going, Romberg negative, and gait is normal.  Neuropsychiatric: General: normal, calm, and normal eye contact  Level of consciousness: alert / normal

## 2024-07-26 NOTE — PLAN OF CARE
Data: Patient presented to [facility]: 2024  3:56 PM.  Reason for maternal/fetal assessment is leaking vaginal fluid. Patient reports leaking of fluid at home about 3;30 PM. Patient denies uterine contractions, vaginal bleeding, abdominal pain, pelvic pressure, nausea, vomiting, headache, visual disturbances, epigastric or RUQ pain. Patient reports fetal movement is normal. Patient is a 37w6d .  Prenatal record reviewed. Pregnancy has been uncomplicated.    Vital signs wnl. Support person is present.     Action: Triage assessment completed.     Response: Patient verbalized agreement with plan. Will contact Dr. Thomas with update and further orders.

## 2024-07-27 ENCOUNTER — ANESTHESIA EVENT (OUTPATIENT)
Dept: OBGYN | Facility: HOSPITAL | Age: 29
End: 2024-07-27
Payer: MEDICAID

## 2024-07-27 ENCOUNTER — ANESTHESIA (OUTPATIENT)
Dept: OBGYN | Facility: HOSPITAL | Age: 29
End: 2024-07-27
Payer: MEDICAID

## 2024-07-27 LAB — T PALLIDUM AB SER QL: NONREACTIVE

## 2024-07-27 PROCEDURE — 10H07YZ INSERTION OF OTHER DEVICE INTO PRODUCTS OF CONCEPTION, VIA NATURAL OR ARTIFICIAL OPENING: ICD-10-PCS | Performed by: FAMILY MEDICINE

## 2024-07-27 PROCEDURE — 250N000011 HC RX IP 250 OP 636: Performed by: FAMILY MEDICINE

## 2024-07-27 PROCEDURE — 59400 OBSTETRICAL CARE: CPT | Performed by: NURSE ANESTHETIST, CERTIFIED REGISTERED

## 2024-07-27 PROCEDURE — 258N000003 HC RX IP 258 OP 636: Performed by: NURSE ANESTHETIST, CERTIFIED REGISTERED

## 2024-07-27 PROCEDURE — 250N000009 HC RX 250: Performed by: NURSE ANESTHETIST, CERTIFIED REGISTERED

## 2024-07-27 PROCEDURE — 250N000009 HC RX 250: Performed by: FAMILY MEDICINE

## 2024-07-27 PROCEDURE — 258N000003 HC RX IP 258 OP 636: Performed by: FAMILY MEDICINE

## 2024-07-27 PROCEDURE — 120N000001 HC R&B MED SURG/OB

## 2024-07-27 PROCEDURE — 250N000011 HC RX IP 250 OP 636: Performed by: NURSE ANESTHETIST, CERTIFIED REGISTERED

## 2024-07-27 RX ORDER — FENTANYL/BUPIVACAINE/NS/PF 2-1250MCG
PLASTIC BAG, INJECTION (ML) INJECTION
Status: COMPLETED
Start: 2024-07-27 | End: 2024-07-27

## 2024-07-27 RX ORDER — FENTANYL CITRATE-0.9 % NACL/PF 10 MCG/ML
100 PLASTIC BAG, INJECTION (ML) INTRAVENOUS EVERY 5 MIN PRN
Status: DISCONTINUED | OUTPATIENT
Start: 2024-07-27 | End: 2024-07-28 | Stop reason: HOSPADM

## 2024-07-27 RX ORDER — ONDANSETRON 4 MG/1
4 TABLET, ORALLY DISINTEGRATING ORAL EVERY 6 HOURS PRN
Status: DISCONTINUED | OUTPATIENT
Start: 2024-07-27 | End: 2024-07-28 | Stop reason: HOSPADM

## 2024-07-27 RX ORDER — LIDOCAINE HYDROCHLORIDE AND EPINEPHRINE 15; 5 MG/ML; UG/ML
INJECTION, SOLUTION EPIDURAL PRN
Status: DISCONTINUED | OUTPATIENT
Start: 2024-07-27 | End: 2024-07-28

## 2024-07-27 RX ORDER — DIPHENHYDRAMINE HCL 25 MG
25 CAPSULE ORAL EVERY 6 HOURS PRN
Status: DISCONTINUED | OUTPATIENT
Start: 2024-07-27 | End: 2024-07-29 | Stop reason: HOSPADM

## 2024-07-27 RX ORDER — DIPHENHYDRAMINE HYDROCHLORIDE 50 MG/ML
25 INJECTION INTRAMUSCULAR; INTRAVENOUS EVERY 6 HOURS PRN
Status: DISCONTINUED | OUTPATIENT
Start: 2024-07-27 | End: 2024-07-29 | Stop reason: HOSPADM

## 2024-07-27 RX ORDER — ONDANSETRON 2 MG/ML
4 INJECTION INTRAMUSCULAR; INTRAVENOUS EVERY 6 HOURS PRN
Status: DISCONTINUED | OUTPATIENT
Start: 2024-07-27 | End: 2024-07-28 | Stop reason: HOSPADM

## 2024-07-27 RX ADMIN — PENICILLIN G 3 MILLION UNITS: 3000000 INJECTION, SOLUTION INTRAVENOUS at 06:11

## 2024-07-27 RX ADMIN — SODIUM CHLORIDE, POTASSIUM CHLORIDE, SODIUM LACTATE AND CALCIUM CHLORIDE 1000 ML: 600; 310; 30; 20 INJECTION, SOLUTION INTRAVENOUS at 18:17

## 2024-07-27 RX ADMIN — SODIUM CHLORIDE, POTASSIUM CHLORIDE, SODIUM LACTATE AND CALCIUM CHLORIDE: 600; 310; 30; 20 INJECTION, SOLUTION INTRAVENOUS at 05:10

## 2024-07-27 RX ADMIN — Medication 3 ML: at 18:38

## 2024-07-27 RX ADMIN — PENICILLIN G 3 MILLION UNITS: 3000000 INJECTION, SOLUTION INTRAVENOUS at 18:31

## 2024-07-27 RX ADMIN — Medication 10 ML/HR: at 18:48

## 2024-07-27 RX ADMIN — SODIUM CHLORIDE, POTASSIUM CHLORIDE, SODIUM LACTATE AND CALCIUM CHLORIDE: 600; 310; 30; 20 INJECTION, SOLUTION INTRAVENOUS at 19:06

## 2024-07-27 RX ADMIN — SODIUM CHLORIDE, POTASSIUM CHLORIDE, SODIUM LACTATE AND CALCIUM CHLORIDE 500 ML: 600; 310; 30; 20 INJECTION, SOLUTION INTRAVENOUS at 02:40

## 2024-07-27 RX ADMIN — PENICILLIN G 3 MILLION UNITS: 3000000 INJECTION, SOLUTION INTRAVENOUS at 22:05

## 2024-07-27 RX ADMIN — PENICILLIN G 3 MILLION UNITS: 3000000 INJECTION, SOLUTION INTRAVENOUS at 10:23

## 2024-07-27 RX ADMIN — Medication 6 MILLI-UNITS/MIN: at 19:51

## 2024-07-27 RX ADMIN — PENICILLIN G 3 MILLION UNITS: 3000000 INJECTION, SOLUTION INTRAVENOUS at 02:00

## 2024-07-27 ASSESSMENT — ACTIVITIES OF DAILY LIVING (ADL)
ADLS_ACUITY_SCORE: 25
ADLS_ACUITY_SCORE: 20
ADLS_ACUITY_SCORE: 25
ADLS_ACUITY_SCORE: 20
ADLS_ACUITY_SCORE: 25
ADLS_ACUITY_SCORE: 20
ADLS_ACUITY_SCORE: 25
ADLS_ACUITY_SCORE: 20

## 2024-07-27 NOTE — PLAN OF CARE
FHT not reading consistently on donovan. Pt went to sit on Repairogen ball and donovan monitor read MHR at 2105. RN requested patient to stand for a moment while monitor adjusted. FHT went back to baseline. Pt back onto birthing ball.

## 2024-07-27 NOTE — PLAN OF CARE
Data: Patient admitted to room 4242 at 1700. Patient is a . Prenatal record reviewed.   OB History    Para Term  AB Living   2 1 1 0 0 1   SAB IAB Ectopic Multiple Live Births   0 0 0 0 0      # Outcome Date GA Lbr Srini/2nd Weight Sex Type Anes PTL Lv   2 Current            1 Term 17           .  Medical History:   Past Medical History:   Diagnosis Date    Migraine     Uncomplicated asthma    .  Gestational age 37w6d. Vital signs per doc flowsheet. Fetal movement present. Patient reports Rule out rupture of membranes   as reason for admission. Support persons Pool present.  Action: Admission assessment completed. Patient and support persons educated on labor process. Patient instructed to report change in fetal movement, contractions, vaginal leaking of fluid or bleeding, abdominal pain, or any concerns related to the pregnancy to her nurse/physician. Patient oriented to room, call light in reach.   Response: Dr. Thomas informed. Plan per provider is to start Pitocin. Patient verbalized understanding of education and verbalized agreement with plan. Patient coping with labor.  Pt presented to triage for Rule out rupture of membranes. ROM plus came back positive, pt admitted to floor per Dr. Thomas. Pitocin started to induce labor as pt was not adam during triage. Baby EFM Cat 1 strip.

## 2024-07-27 NOTE — PLAN OF CARE
Labor Shift Note  Data: See Flowsheets activity for contraction and fetal documentation.   Signs and symptoms of infection Absent.    Complications in labor: Absent. Support person is present.  Interventions: Continue uterine/fetal assessment per orders and nursing discretion. Comfort measures/pain control/labor management: Ambulation, hydration, position changes, birthing ball/sling and tub options to facilitate labor. Patient refusing tub and position changes at this time. Patient sleeping in between cares.   Pain medication options of Nitrous Oxide, Fentanyl IV and epidural reviewed with pt. Pt is interested in epidural once pain increases.  Continue labor augmentation with Pitocin  Plan: Anticipate . Provide labor/coping assistance as needed by patient and support person.  Observe for and notify care provider of indications of progressing labor, need for pain medications, or signs of fetal/maternal compromise.

## 2024-07-27 NOTE — PLAN OF CARE
Face to face report given with opportunity to observe patient. Handoff on Pitocin done.    Report given to MARLA Dickson RN   7/26/2024  7:26 PM

## 2024-07-27 NOTE — PLAN OF CARE
Provider notification:   Provider: Dr. William ALVAREZ was notified at 2130 regarding: a persistent Category II fetal heart rate tracing for 55 minutes.    Category II Algorithm     Fetal heart rate and uterine activity reviewed with provider.    EFM interpretation suggests: absence of concern for metabolic acidemia due to: presence of accelerations and moderate variability. EFM suggests concern for interruption of the oxygen pathway due to:  variable decelerations..     Interventions to improve fetal oxygenation for a Category II tracing include: Category II algorithm reviewed, continue monitoring, emotional support, evaluate labor progress, maternal positioning, and sterile vaginal exam    After discussion with provider: MD notified patient was laying flat on her back and FHT came back to baseline once repositioned. MD notified SVE 3/50%/-2. MD acknowledged and stated patient can have epidural whenever requested. Plan to continue monitoring and encourage patient to continue frequent position changes.

## 2024-07-27 NOTE — PROGRESS NOTES
"S: Came to evaluate patient, still fairly comfortable, not feeling contractions too much. Contractions also not picking up well on monitor per RN. Discussed IUPC placement, including R/B/A, and patient provided informed consent.    O:  Vital signs:  Temp: 96.8  F (36  C) Temp src: Tympanic BP: 111/67 Pulse: 90   Resp: 16 SpO2: 98 % O2 Device: None (Room air)        Estimated body mass index is 34.71 kg/m  as calculated from the following:    Height as of 24: 1.803 m (5' 11\").    Weight as of 24: 112.9 kg (248 lb 14.4 oz).    FHR: 140bpm, moderate, + accels, no decels  IUPC: q1-4min  SVE: 6/70/-2    A/P: Patient is a 27yo  who presented at 37.6wga (now 38.0wga) with PROM, now inducing with pitocin.   -IUPC placed successfully, will monitor MVU's  -Continue pitocin per protocol  -Epidural as desired as labor progresses  -Anticipate     Kathy Thomas MD  Family Medicine with Obstetrics      "

## 2024-07-27 NOTE — PLAN OF CARE
Dr. Thomas paged and notified of decelerations in fetal heart rate. MD notified of position changes, fluid bolus, pitocin stopped, and SVE of 3.5/50/-2. MD notified FHT have come back to baseline. Plan per provider is to continue to titrate up on pitocin and notify MD with changes.

## 2024-07-27 NOTE — ANESTHESIA PREPROCEDURE EVALUATION
Anesthesia Pre-Procedure Evaluation    Patient: Frances Garcia   MRN: [id number] : 1995        Procedure :           Past Medical History:   Diagnosis Date    Migraine     Uncomplicated asthma       Past Surgical History:   Procedure Laterality Date    ESOPHAGOSCOPY, GASTROSCOPY, DUODENOSCOPY (EGD), COMBINED N/A 2015    Procedure: COMBINED ESOPHAGOSCOPY, GASTROSCOPY, DUODENOSCOPY (EGD);  Surgeon: Gregory Boyce MD;  Location: [facility]      Allergies   Allergen Reactions    Dust Mites       Social History     Tobacco Use    Smoking status: Never    Smokeless tobacco: Never   Substance Use Topics    Alcohol use: No      Wt Readings from Last 1 Encounters:   24 112.9 kg (248 lb 14.4 oz)        Anesthesia Evaluation   Pt has had prior anesthetic. Type: MAC and OB Labor Epidural.    No history of anesthetic complications       ROS/MED HX  ENT/Pulmonary:     (+)                     Intermittent, asthma                  Neurologic: Comment: Lower back pressure always even not pregnant    (+)      migraines,                          Cardiovascular: Comment: Patient with pitting edema in LE also noted in HP. Per patient edema started about a month ago.       METS/Exercise Tolerance:     Hematologic:  - neg hematologic  ROS     Musculoskeletal:       GI/Hepatic:     (+) GERD,                   Renal/Genitourinary:       Endo:     (+)               Obesity,       Psychiatric/Substance Use:     (+) psychiatric history anxiety       Infectious Disease:       Malignancy:       Other:            Physical Exam    Airway        Mallampati: I   TM distance: < 3 FB   Neck ROM: full   Mouth opening: > 3 cm    Respiratory Devices and Support         Dental  no notable dental history         Cardiovascular   cardiovascular exam normal          Pulmonary   pulmonary exam normal            Other findings: PROM  Labs reviewed  fetal choroid plexus cyst and possible VSD, however detailed MFM US showed no CPC and no  "VSD  Lower extremity edema, dopplers negative for DVT per H&P William  Per patient quick delivery with first child and uncomplicated epidural. However, patient reports she always has low back pain ever since her first epidural years ago was placed.     OUTSIDE LABS:  CBC:   Lab Results   Component Value Date    WBC 11.1 (H) 07/26/2024    WBC 13.6 (H) 07/14/2024    HGB 11.6 (L) 07/26/2024    HGB 12.4 07/14/2024    HCT 33.1 (L) 07/26/2024    HCT 34.8 (L) 07/14/2024     07/26/2024     07/14/2024     BMP:   Lab Results   Component Value Date     07/14/2024     12/02/2016    POTASSIUM 3.9 07/14/2024    POTASSIUM 3.9 12/02/2016    CHLORIDE 104 07/14/2024    CHLORIDE 107 12/02/2016    CO2 17 (L) 07/14/2024    CO2 23 12/02/2016    BUN 8.5 07/14/2024    BUN 9 12/02/2016    CR 0.54 07/14/2024    CR 0.56 12/02/2016     (H) 07/14/2024    GLC 81 12/02/2016     COAGS: No results found for: \"PTT\", \"INR\", \"FIBR\"  POC:   Lab Results   Component Value Date    HCG Negative 11/06/2015     HEPATIC:   Lab Results   Component Value Date    ALBUMIN 3.1 (L) 07/14/2024    PROTTOTAL 6.1 (L) 07/14/2024    ALT 9 07/14/2024    AST 13 07/14/2024    ALKPHOS 111 07/14/2024    BILITOTAL 0.9 07/14/2024     OTHER:   Lab Results   Component Value Date    SUZANNE 8.8 07/14/2024       Anesthesia Plan    ASA Status:  3       Anesthesia Type: Epidural.              Consents    Anesthesia Plan(s) and associated risks, benefits, and realistic alternatives discussed. Questions answered and patient/representative(s) expressed understanding.     - Discussed:     - Discussed with:  Patient            Postoperative Care            Comments:    Other Comments: CRNA consulted AVTAR Esquivel if safe to proceed with EPIDURAL. Of note, Sierra has not been consulted by Dr. Thomas. Per Sierra it is reasonable to attempt to place epidural to plan for vaginal birth. Will proceed.  Discussed risks and benefits with patient including itching, sore " back, infection, hematoma, spinal headache, CV complications, inability to place, nerve damage. Pt wishes to proceed.        neg OB ROS.      HEATH Moore CRNA    I have reviewed the pertinent notes and labs in the chart from the past 30 days and (re)examined the patient.  Any updates or changes from those notes are reflected in this note.

## 2024-07-27 NOTE — PLAN OF CARE
Face to face report given with opportunity to observe patient.    Report given to Sofia Durham RN   7/27/2024  7:00 AM

## 2024-07-27 NOTE — ANESTHESIA PROCEDURE NOTES
"Epidural catheter Procedure Note    Pre-Procedure   Staff -        CRNA: Randi Montgomery APRN CRNA       Performed By: CRNA       Location: OB       Procedure Start/Stop Times: 7/27/2024 6:35 PM and 7/27/2024 6:52 PM  Timeout:       Correct Patient: Yes        Correct Procedure: Yes        Correct Site: Yes        Correct Position: Yes   Procedure Documentation  Procedure: epidural catheter       Patient Position: sitting       Patient Prep/Sterile Barriers: sterile gloves, mask, patient draped       Skin prep: Betadine       Local skin infiltrated with 3 mL of 1% lidocaine.        Insertion Site: L3-4. (midline approach).       Technique: LORT saline        GANGA at 8 cm.       Needle Type: Ambronite       Needle Gauge: 18.        Catheter: 20 G.          Catheter threaded easily.             # of attempts: 1 and  # of redirects:  1    Assessment/Narrative         Paresthesias: No.       Test dose of 3 mL lidocaine 1.5% w/ 1:200,000 epinephrine at 18:38 CDT.         Test dose negative, 3 minutes after injection, for signs of intravascular, subdural, or intrathecal injection.       Insertion/Infusion Method: LORT saline       Aspiration negative for Heme or CSF via Epidural Catheter.    Medication(s) Administered   Medication Administration Time: 7/27/2024 6:35 PM     Comments:  No complications  Patient leans to right seemingly naturally. Patient redirected to midline and spine straightened nicely.   LOT [id number]      FOR [facility] ONLY:   Pain Team Contact information: please page the Pain Team Via [email or web address]. Search \"Pain\". During daytime hours, please page the attending first. At night please page the resident first.      "

## 2024-07-27 NOTE — PLAN OF CARE
Labor Shift Note  Data: See Flowsheets activity for contraction and fetal documentation.   Vitals:    24 0722 24 0830 24 0930 24 1022   BP: 111/67  119/70 130/68   BP Location: Left arm   Left arm   Patient Position: Semi-Church's   Semi-Church's   Cuff Size: Adult Regular   Adult Regular   Pulse:    95   Resp: 16 18  20   Temp: 96.8  F (36  C) 96.9  F (36.1  C)  97  F (36.1  C)   TempSrc: Tympanic Tympanic  Tympanic   SpO2: 98%   97%     Signs and symptoms of infection Absent.    Complications in labor: Absent. Support person Pool present.  Interventions: Continue uterine/fetal assessment per orders and nursing discretion. Vital Signs per order set. Comfort measures/pain control/labor management: Ambulation, hydration, position changes, birthing ball/sling and tub options to facilitate labor.  Plan: Anticipate . Provide labor/coping assistance as needed by patient and support person.  Observe for and notify care provider of indications of progressing labor, need for pain medications, or signs of fetal/maternal compromise.

## 2024-07-28 LAB
HGB BLD-MCNC: 11.2 G/DL (ref 11.7–15.7)
HOLD SPECIMEN: NORMAL

## 2024-07-28 PROCEDURE — 10907ZC DRAINAGE OF AMNIOTIC FLUID, THERAPEUTIC FROM PRODUCTS OF CONCEPTION, VIA NATURAL OR ARTIFICIAL OPENING: ICD-10-PCS | Performed by: FAMILY MEDICINE

## 2024-07-28 PROCEDURE — 722N000001 HC LABOR CARE VAGINAL DELIVERY SINGLE

## 2024-07-28 PROCEDURE — 250N000011 HC RX IP 250 OP 636: Mod: JW | Performed by: NURSE ANESTHETIST, CERTIFIED REGISTERED

## 2024-07-28 PROCEDURE — 250N000013 HC RX MED GY IP 250 OP 250 PS 637: Performed by: FAMILY MEDICINE

## 2024-07-28 PROCEDURE — 258N000003 HC RX IP 258 OP 636: Performed by: FAMILY MEDICINE

## 2024-07-28 PROCEDURE — 120N000001 HC R&B MED SURG/OB

## 2024-07-28 PROCEDURE — 258N000003 HC RX IP 258 OP 636: Performed by: NURSE ANESTHETIST, CERTIFIED REGISTERED

## 2024-07-28 PROCEDURE — 85018 HEMOGLOBIN: CPT | Performed by: FAMILY MEDICINE

## 2024-07-28 PROCEDURE — 250N000009 HC RX 250: Performed by: FAMILY MEDICINE

## 2024-07-28 PROCEDURE — 36415 COLL VENOUS BLD VENIPUNCTURE: CPT | Performed by: FAMILY MEDICINE

## 2024-07-28 RX ORDER — MODIFIED LANOLIN
OINTMENT (GRAM) TOPICAL
Status: DISCONTINUED | OUTPATIENT
Start: 2024-07-28 | End: 2024-07-29 | Stop reason: HOSPADM

## 2024-07-28 RX ORDER — IBUPROFEN 800 MG/1
800 TABLET, FILM COATED ORAL EVERY 6 HOURS PRN
Status: DISCONTINUED | OUTPATIENT
Start: 2024-07-28 | End: 2024-07-29 | Stop reason: HOSPADM

## 2024-07-28 RX ORDER — METHYLERGONOVINE MALEATE 0.2 MG/ML
200 INJECTION INTRAVENOUS
Status: DISCONTINUED | OUTPATIENT
Start: 2024-07-28 | End: 2024-07-29 | Stop reason: HOSPADM

## 2024-07-28 RX ORDER — HYDROCORTISONE 25 MG/G
CREAM TOPICAL 3 TIMES DAILY PRN
Status: DISCONTINUED | OUTPATIENT
Start: 2024-07-28 | End: 2024-07-29 | Stop reason: HOSPADM

## 2024-07-28 RX ORDER — ACETAMINOPHEN 325 MG/1
650 TABLET ORAL EVERY 4 HOURS PRN
Status: DISCONTINUED | OUTPATIENT
Start: 2024-07-28 | End: 2024-07-29 | Stop reason: HOSPADM

## 2024-07-28 RX ORDER — DOCUSATE SODIUM 100 MG/1
100 CAPSULE, LIQUID FILLED ORAL DAILY
Status: DISCONTINUED | OUTPATIENT
Start: 2024-07-28 | End: 2024-07-29 | Stop reason: HOSPADM

## 2024-07-28 RX ORDER — OXYTOCIN/0.9 % SODIUM CHLORIDE 30/500 ML
340 PLASTIC BAG, INJECTION (ML) INTRAVENOUS CONTINUOUS PRN
Status: DISCONTINUED | OUTPATIENT
Start: 2024-07-28 | End: 2024-07-29 | Stop reason: HOSPADM

## 2024-07-28 RX ORDER — TRANEXAMIC ACID 10 MG/ML
1 INJECTION, SOLUTION INTRAVENOUS EVERY 30 MIN PRN
Status: DISCONTINUED | OUTPATIENT
Start: 2024-07-28 | End: 2024-07-29 | Stop reason: HOSPADM

## 2024-07-28 RX ORDER — LOPERAMIDE HCL 2 MG
4 CAPSULE ORAL
Status: DISCONTINUED | OUTPATIENT
Start: 2024-07-28 | End: 2024-07-29 | Stop reason: HOSPADM

## 2024-07-28 RX ORDER — OXYTOCIN 10 [USP'U]/ML
10 INJECTION, SOLUTION INTRAMUSCULAR; INTRAVENOUS
Status: DISCONTINUED | OUTPATIENT
Start: 2024-07-28 | End: 2024-07-29 | Stop reason: HOSPADM

## 2024-07-28 RX ORDER — BISACODYL 10 MG
10 SUPPOSITORY, RECTAL RECTAL DAILY PRN
Status: DISCONTINUED | OUTPATIENT
Start: 2024-07-28 | End: 2024-07-29 | Stop reason: HOSPADM

## 2024-07-28 RX ORDER — MISOPROSTOL 200 UG/1
400 TABLET ORAL
Status: DISCONTINUED | OUTPATIENT
Start: 2024-07-28 | End: 2024-07-29 | Stop reason: HOSPADM

## 2024-07-28 RX ORDER — LOPERAMIDE HCL 2 MG
2 CAPSULE ORAL
Status: DISCONTINUED | OUTPATIENT
Start: 2024-07-28 | End: 2024-07-29 | Stop reason: HOSPADM

## 2024-07-28 RX ORDER — CARBOPROST TROMETHAMINE 250 UG/ML
250 INJECTION, SOLUTION INTRAMUSCULAR
Status: DISCONTINUED | OUTPATIENT
Start: 2024-07-28 | End: 2024-07-29 | Stop reason: HOSPADM

## 2024-07-28 RX ADMIN — ACETAMINOPHEN 650 MG: 325 TABLET, FILM COATED ORAL at 20:10

## 2024-07-28 RX ADMIN — SODIUM CHLORIDE, POTASSIUM CHLORIDE, SODIUM LACTATE AND CALCIUM CHLORIDE 500 ML: 600; 310; 30; 20 INJECTION, SOLUTION INTRAVENOUS at 01:11

## 2024-07-28 RX ADMIN — Medication 340 ML/HR: at 02:06

## 2024-07-28 RX ADMIN — IBUPROFEN 800 MG: 800 TABLET, FILM COATED ORAL at 16:19

## 2024-07-28 RX ADMIN — IBUPROFEN 800 MG: 800 TABLET, FILM COATED ORAL at 02:31

## 2024-07-28 RX ADMIN — Medication: at 01:22

## 2024-07-28 RX ADMIN — DOCUSATE SODIUM 100 MG: 100 CAPSULE, LIQUID FILLED ORAL at 16:19

## 2024-07-28 ASSESSMENT — ACTIVITIES OF DAILY LIVING (ADL)
ADLS_ACUITY_SCORE: 27
ADLS_ACUITY_SCORE: 27
ADLS_ACUITY_SCORE: 28
ADLS_ACUITY_SCORE: 28
ADLS_ACUITY_SCORE: 27
ADLS_ACUITY_SCORE: 28
ADLS_ACUITY_SCORE: 25
ADLS_ACUITY_SCORE: 27
ADLS_ACUITY_SCORE: 25
ADLS_ACUITY_SCORE: 27
ADLS_ACUITY_SCORE: 27
ADLS_ACUITY_SCORE: 28
ADLS_ACUITY_SCORE: 25
ADLS_ACUITY_SCORE: 28
ADLS_ACUITY_SCORE: 27
ADLS_ACUITY_SCORE: 20
ADLS_ACUITY_SCORE: 28
ADLS_ACUITY_SCORE: 28
ADLS_ACUITY_SCORE: 20
ADLS_ACUITY_SCORE: 28
ADLS_ACUITY_SCORE: 27
ADLS_ACUITY_SCORE: 25
ADLS_ACUITY_SCORE: 20

## 2024-07-28 NOTE — L&D DELIVERY NOTE
DELIVERY SUMMARY    Frances Garcia MRN# [id number]   Age: 28 year old YOB: 1995     ASSESSMENT & PLAN: Yessi is a 27yo  who presented at 37.6wga with term premature rupture of membranes. She was induced with pitocin, and had AROM of forebag. GBS positive, so received antibiotic prophylaxis with penicillin throughout labor. She had epidural and pushed for an  of a viable female infant at 38.1wga, APGARS 8/9. Bilateral perilabial abrasions noted that were hemostatic and did not require repair.     -Anticipate routine postpartum cares  -Follow up with me in 2 weeks and 6 weeks for postpartum check.   -Encouraged exclusive breastfeeding  -Anticipate discharge in 1-2 days.    [place] Assessment Tool Data    Gestational Age:  Gestational Age: 38w1d     Maternal temperature range:  Temp  Av.7  F (36.5  C)  Min: 96.8  F (36  C)  Max: 98.6  F (37  C)    Membranes ruptured for:   (Delivered) Days: 1 Hours: 10 Minutes: 33     GBS status:  GBS positive, treated    Antibiotic Status:  Antibiotics Penicillin      IV Antibiotic Given Greater than 4 hours prior to delivery      Additional Management     Fetal Status Prior to  Delivery Category 2    Fetal Status Comments variable decels with contractions      Disposition:  Stable        Radha, Female-Frances [[id number]]      Labor Event Times      Latent labor onset date/time: 2024 1600    Active labor onset date: 24 Onset time:  7:59 AM   Dilation complete date: 24 Complete time:  1:48 AM   Start pushing date/time: 2024 0150          Labor Length      1st Stage (hrs): 17 (min): 49   2nd Stage (hrs): 0 (min): 15   3rd Stage (hrs): 0 (min): 4          Labor Events     labor?: No   steroids: None  Labor Type: Induction/Cervical ripening  Predominate monitoring during 1st stage: intermittent auscultation, continuous electronic fetal monitoring     Antibiotics received during labor?: Yes  Reason for Antibiotics:  "GBS  Antibiotics received for GBS: Penicillin  Antibiotics Given (GBS): Greater than 4 hours prior to delivery       Rupture date/time: 24 1530   Rupture type: Artificial Rupture of Membranes, Spontaneous Rupture of Membranes  Fluid color: Clear  Fluid odor: Normal     Induction: Oxytocin  Induction date/time:      Cervical ripening date/time:      Indications for induction: Prelabor ROM     Augmentation: None       Delivery/Placenta Date and Time      Delivery Date: 24 Delivery Time:  2:03 AM   Placenta Date/Time: 2024  2:07 AM  Oxytocin given at the time of delivery: after delivery of placenta  Delivering clinician: Kathy Thomas MD              Vaginal Counts       Initial count performed by 2 team members:  Two Team Members   Sofia MARIN RN         Mar Lin Suture Needles Sponges (RETIRED) Instruments   Initial counts 1  15    Added to count       Relief counts       Final counts 1  15            Placed during labor Accounted for at the end of labor   FSE No NA   IUPC Yes Yes   Cervidil No NA                  Final count performed by 2 team members:  Two Team Members   Dr. William Durham RN      Final count correct?: Yes       Apgars    Living status: Living   1 Minute 5 Minute 10 Minute 15 Minute 20 Minute   Skin color: 0  1       Heart rate: 2  2       Reflex irritability: 2  2       Muscle tone: 2  2       Respiratory effort: 2  2       Total: 8  9       Apgars assigned by: SYBIL SHANKAR RN       Cord      Vessels: 3 Vessels    Cord Complications: None               Gases Sent?: No    Delayed cord clamping?: Yes    Cord Clamping Delay (seconds):  seconds    Stem cell collection?: No            Resuscitation    Methods: None       George Measurements      Weight: 7 lb 12.2 oz Length: 1' 9\"     Head circumference: 36.5 cm Chest circumference: 34.5 cm   Abdominal girth: 34 cm       Skin to Skin and Feeding Plan      Skin to skin initiation date/time: 1841    Skin " to skin with: Mother  Skin to skin end date/time: 1/7/1841    Breastfeeding initiated date/time: 7/28/2024 0251       Labor Events and Shoulder Dystocia    Fetal Tracing Prior to Delivery: Category 2  Fetal Tracing Comments: variable decels with contractions  Shoulder dystocia present?: Neg       Delivery (Maternal) (Provider to Complete) (420072)    Episiotomy: None  Perineal lacerations: None      Labial laceration: bilateral Repaired?: No   Repair suture: None  Genital tract inspection done: Pos       Blood Loss  Mother: Frances Garcia #[id number]     Start of Mother's Information      Delivery Blood Loss  07/27/24 0759 - 07/28/24 1801      Postpartum QBL (mL) Hospital Encounter 50 mL    Total  50 mL               End of Mother's Information  Mother: Frances Garcia #[id number]                Delivery - Provider to Complete (905253)    Delivering clinician: Kathy Thomas MD  Delivery Type (Choose the 1 that will go to the Birth History): Vaginal, Spontaneous                                           Placenta    Date/Time: 7/28/2024  2:07 AM  Removal: Expressed  Disposition: Hospital disposal             Anesthesia    Method: Epidural  Cervical dilation at placement: 4-7                    Presentation and Position    Presentation: Vertex    Position: Left Occiput Anterior                     Kathy Thomas MD

## 2024-07-28 NOTE — PLAN OF CARE
Dr. Thomas called and notified of decelerations. MD stated she was on her way to pt room.     Order for LR Bolus 500mL and to continue pitocin. Patient reports feeling vaginal pressure, SVE per MD. MD reviewed fetal tracing and remains on unit.

## 2024-07-28 NOTE — PLAN OF CARE
Face to face report given with opportunity to observe patient.    Report given to Sofia Durham RN   7/28/2024  7:09 AM

## 2024-07-28 NOTE — PLAN OF CARE
MD and RN at bedside for duration of pushing. RN and MD continuously reviewing FHT. Patient pushing very well, moving baby down in station, and tolerating well.      of viable baby girl at 0203. Baby placed skin to skin with mom. Nani cares done. VS and fundal checks per orders. Patient reports both legs are still numb and wishes to wait until done breastfeeding to attempt to sit at edge of bed.

## 2024-07-28 NOTE — PLAN OF CARE
Dr. Thomas called and updated on pt status, SVE of 7-8 with possible forebag, MVUs, pitocin rate and FHT. Provider plans to come to the bedside to assess patient. Plan to continue with pitocin augmentation and frequent position changes.

## 2024-07-28 NOTE — PLAN OF CARE
Assessments completed as charted. B/P: 106/61, T: 97.6, P: 88, R: 16. Rates pain: 0/10. Voiding without difficulty. Fundus: Midline, firm w/o massage, at umbilicus. Lochia: Scant. Activity: unrestricted with out pain . Infant feeding: Breast feeding going well.     Postpartum breastfeeding assessment completed and education provided, see Patient Education Activity.  Items included in the education are:   proper positioning and latch  effectiveness of feeding  manual expression  handling and storing breastmilk  maintenance of breastfeeding for the first 6 months  sign/symptoms of infant feeding issues requiring referral to qualified health care provider  Postpartum care education provided, see Patient Education activity. Patient denies needs. Will monitor.    Sofia Shearer RN

## 2024-07-28 NOTE — PLAN OF CARE
Face to face report given with opportunity to observe patient.    Report given to MARLA Holder RN   7/27/2024  7:00 PM

## 2024-07-28 NOTE — PLAN OF CARE
Pt's legs still numb from epidural, pt dangled at edge of bed but could not stand. Plan to try ambulating to bathroom once numbness subsides.

## 2024-07-28 NOTE — PROGRESS NOTES
"S: Came to bedside after notification of forebag. Discussed AROM of forebag with patient, including R/B/A. Patient provided informed consent.     O:  Vital signs:  Temp: 98  F (36.7  C) Temp src: Tympanic BP: 118/67 Pulse: 95   Resp: 18 SpO2: 98 % O2 Device: None (Room air)        Estimated body mass index is 34.71 kg/m  as calculated from the following:    Height as of 24: 1.803 m (5' 11\").    Weight as of 24: 112.9 kg (248 lb 14.4 oz).  FHR: 140, moderate, + accels, no decels = reactive  IUPC: q2min, MVUs not quite adequate  SVE: unchanged from RN check, forebag noted  Pit: 18mU/min    A/P: Patient is a 29yo  who presented at 37.6wga (now 38.1wga) with PROM, with epidural and inducing with pitocin. Has had prolonged ROM at this point, with no signs of chorio.   -AROM of forebag performed with clear fluid.   -Continue increasing pitocin per protocol to achieve adequate MVUs per IUPC.  -Monitor for signs/symptoms of chorio given prolonged ROM  -Antibiotic prophylaxis for GBS+ status  -Goal for     Kathy Thomas MD  Family Medicine with Obstetrics    "

## 2024-07-28 NOTE — PLAN OF CARE
Patient sat at edge of the bed, then attempted to stand but her left leg is still numb, pt unable to stand. Patient transferred to bedside commode with RN assist x 2 and gait belt. Pt unable to void reporting she feels no urge. Bladder scan volume 50 mL. Pt encouraged to drink fluids orally before attempting to void again.     Bed cleaned and linen changed. Nani Cares done. Ice on.

## 2024-07-28 NOTE — PROGRESS NOTES
"S: Called to bedside due to variable decelerations. Pitocin was decreased to 14mU/min, and patient repositioned. No improvement, so I instructed RN to start IV fluid bolus. Variable decels improved. Patient reporting increased pelvic pressure at this time.    O:  Vital signs:  Temp: 98.1  F (36.7  C) Temp src: Tympanic BP: 111/70 Pulse: 95   Resp: 18 SpO2: 99 % O2 Device: None (Room air)        Estimated body mass index is 34.71 kg/m  as calculated from the following:    Height as of 24: 1.803 m (5' 11\").    Weight as of 24: 112.9 kg (248 lb 14.4 oz).    FHR: 135bpm, moderated variability, + variable decels noted with contractions that recovered with IV fluid bolus  IUPC: q2-3min  SVE: 8/90/0    A/P: Patient is a 29yo  who presented at 37.6wga (now 38.1wga) with PROM, with epidural and inducing with pitocin, s/p AROM of forebag. Variable decels improved with IV fluid bolus and repositioning.   -Continue increasing pitocin per protocol to achieve adequate MVUs per IUPC.  -Monitor for signs/symptoms of chorio given prolonged ROM  -Antibiotic prophylaxis for GBS+ status  -Goal for     Kathy Thomas MD  Family Medicine with Obstetrics    "

## 2024-07-29 VITALS
SYSTOLIC BLOOD PRESSURE: 126 MMHG | OXYGEN SATURATION: 97 % | HEART RATE: 84 BPM | RESPIRATION RATE: 18 BRPM | DIASTOLIC BLOOD PRESSURE: 61 MMHG | BODY MASS INDEX: 35.29 KG/M2 | WEIGHT: 253.05 LBS | TEMPERATURE: 98.5 F

## 2024-07-29 PROCEDURE — 250N000013 HC RX MED GY IP 250 OP 250 PS 637: Performed by: FAMILY MEDICINE

## 2024-07-29 RX ORDER — IBUPROFEN 800 MG/1
800 TABLET, FILM COATED ORAL EVERY 8 HOURS PRN
Qty: 30 TABLET | Refills: 1 | Status: SHIPPED | OUTPATIENT
Start: 2024-07-29

## 2024-07-29 RX ORDER — DOCUSATE SODIUM 100 MG/1
100 CAPSULE, LIQUID FILLED ORAL DAILY
Qty: 30 CAPSULE | Refills: 1 | Status: SHIPPED | OUTPATIENT
Start: 2024-07-29

## 2024-07-29 RX ADMIN — DOCUSATE SODIUM 100 MG: 100 CAPSULE, LIQUID FILLED ORAL at 09:12

## 2024-07-29 RX ADMIN — PRENATAL VITAMINS-IRON FUMARATE 27 MG IRON-FOLIC ACID 0.8 MG TABLET 1 TABLET: at 09:12

## 2024-07-29 ASSESSMENT — ACTIVITIES OF DAILY LIVING (ADL)
ADLS_ACUITY_SCORE: 20

## 2024-07-29 NOTE — DISCHARGE SUMMARY
"[facility]    Discharge Summary  Obstetrics    Date of Admission:  2024  Date of Discharge:  2024  Discharging Provider: Kathy Thomas MD  Date of Service (when I saw the patient): 24    Discharge Diagnoses   Normal labor and delivery  Premature rupture of membranes at term  Status post vaginal delivery    History of Present Illness   Frances Garcia is a 28 year old female who presented at 37.6wga with term premature rupture of membranes. She was induced with pitocin, and had AROM of forebag. GBS positive, so received antibiotic prophylaxis with penicillin throughout labor. She had epidural and pushed for an  of a viable female infant at 38.1wga, APGARS 8/9. Bilateral perilabial abrasions noted that were hemostatic and did not require repair.     Hospital Course   The patient's hospital course was unremarkable.  She recovered as anticipated and experienced no post-delivery complications. On discharge, her pain was well controlled. Vaginal bleeding is similar to peak menstrual flow.  Voiding without difficulty.  Ambulating well and tolerating a normal diet.  No fevers.  Breastfeeding well.  Infant is stable.  She was discharged on post-partum day #1.    Post-partum hemoglobin:   Hemoglobin   Date Value Ref Range Status   2024 11.2 (L) 11.7 - 15.7 g/dL Final   2016 12.0 11.7 - 15.7 g/dL Final     Physical exam on date of discharge:  Vital signs:  Temp: 98.5  F (36.9  C) Temp src: Oral BP: 126/61 Pulse: 84   Resp: 18 SpO2: 97 % O2 Device: None (Room air)        Estimated body mass index is 34.71 kg/m  as calculated from the following:    Height as of 24: 1.803 m (5' 11\").    Weight as of 24: 112.9 kg (248 lb 14.4 oz).  General: awake, alert, no distress  CV: warm, well perfused, regular rhythm  Abd: Fundus firm and below umbilicus  Extremities: 2+ BLE edema, mildly improved from previous    Kathy Thomas MD    Discharge Disposition   Discharged to home "   Condition at discharge: Stable    Primary Care Physician   Ila Vera    Consultations This Hospital Stay   CARE MANAGEMENT / SOCIAL WORK IP CONSULT  ANESTHESIOLOGY IP CONSULT  LACTATION IP CONSULT    Discharge Orders      Breast pump - Manual/Electric    Breast Pump Documentation:  Manual/Electric Pump: To support adequate breast milk production and nutrition for infant.     I, the undersigned, certify that the above prescribed supplies are medically necessary for this patient and is both reasonable and necessary in reference to accepted standards of medical and necessary in reference to accepted standards of medical practice in the treatment of this patient's condition and is not prescribed as a convenience.     Discharge Medications      Review of your medicines        START taking        Dose / Directions   docusate sodium 100 MG capsule  Commonly known as: COLACE      Dose: 100 mg  Take 1 capsule (100 mg) by mouth daily  Quantity: 30 capsule  Refills: 1     ibuprofen 800 MG tablet  Commonly known as: ADVIL/MOTRIN      Dose: 800 mg  Take 1 tablet (800 mg) by mouth every 8 hours as needed for other (cramping)  Quantity: 30 tablet  Refills: 1            CONTINUE these medicines which have NOT CHANGED        Dose / Directions   ALBUTEROL IN      Inhale into the lungs as needed  Refills: 0     Prenatal Gummies 0.18-25 MG Chew      Take 1 Gummy by mouth one time a day.  Refills: 0     triamcinolone 0.025 % external ointment  Commonly known as: KENALOG  Used for: Vaginitis and vulvovaginitis      Apply topically 2 times daily  Quantity: 15 g  Refills: 3            STOP taking      aspirin 81 MG EC tablet        Falmina 0.1-20 MG-MCG tablet  Generic drug: levonorgestrel-ethinyl estradiol        predniSONE 20 MG tablet  Commonly known as: DELTASONE                  Where to get your medicines        These medications were sent to [facility] [address]  [place], [facility] [address]      Phone: [phone]   docusate sodium 100 MG capsule  ibuprofen 800 MG tablet        Allergies   Allergies   Allergen Reactions    Dust Mites

## 2024-07-29 NOTE — PLAN OF CARE
Face to face report given with opportunity to observe patient.    Report given to Sunny Durham RN   7/29/2024  7:14 AM

## 2024-07-29 NOTE — PLAN OF CARE
Data: Vital signs within normal limits. Postpartum checks within normal limits - see flow record. Patient eating and drinking normally. Patient able to empty bladder independently and is up ambulating. No apparent signs of infection.  Patient performing self cares and is able to care for infant.  Action: Patient medicated during the shift for cramping. See MAR.  Patient education done. See flow record.  Response: Positive attachment behaviors observed with infant. Support persons present.   Plan: Anticipate discharge on 07/29/24.

## 2024-07-29 NOTE — PLAN OF CARE
Data: Vital signs within normal limits. Postpartum checks within normal limits - see flow record. Patient eating and drinking normally. Patient able to empty bladder independently and is up ambulating. No apparent signs of infection. Patient performing self cares and is able to care for infant.  Action: Patient denied pain on morning assessment, patient stated she was comfortable. Patient education done, see flow record.  Response: Positive attachment behaviors observed with infant. Support persons is present.   Plan: Anticipate discharge per plan of care.

## 2024-07-29 NOTE — PLAN OF CARE
Data: Vital signs within normal limits. Postpartum checks within normal limits - see flow record. Patient eating and drinking normally. Patient able to empty bladder independently and is up ambulating. No apparent signs of infection. Performing self cares and is able to care for infant.  Action: Patient medicated during the shift for cramping. See MAR. Patient reassessed within 1 hour after each medication and pain was improved - patient stated she was comfortable. Patient education done about breast feeding, educational videos watched. See flow record.  Response: Positive attachment behaviors observed with infant. Support persons Pool present.       Face to face report given with opportunity to observe patient.    Report given to MARLA Holder RN   7/28/2024  7:21 PM

## 2024-07-29 NOTE — LACTATION NOTE
"                                     INPATIENT LACTATION CONSULT    Frances Garcia                                                                             [id number]    Consultation Date: 2024    Reason for Lactation Referral:routine lactation assessment    Baby's :24    Baby's Current Age:1d  Baby's Gestational Age:38w 1d    Provider: Dr. Thomas      Maternal History  Maternal History:none  Breast Surgery:No  Breast Changes During Pregnancy:No  Breast Feeding History:Yes,  successful, Length of Time: 5 months  Delivery:None      Maternal Assessment  Breast Size: large  Nipple Appearance - Left: intact  Nipple Appearance - Right: intact  Nipple Erectility - Left: erect with stimulation  Nipple Erectility - Right: erect with stimulation  Areolas Compressibility: soft  Nipple Size: average  Milk Supply: colostrum    Infant Assessment  Birth Weight: 7 lb 12.2 oz  Current Weight:7 lb 5.1 oz  Weight Loss Percentage: -5.7%  Mouth: small  Palate: normal  Jaw: normal  Tongue: normal  Frenulum: normal  Digital Suck Exam: not assessed      Education  Following education covered with mom. States understanding and denies any questions or concerns.    exclusivity explained and encouraged to establish breastfeeding and order in milk   rooming-in encouraged with explanation of benefits  encourage skin to skin with explanation of benefits  expressed breast milk and lanolin to nipples for healing and comfort as needed  feeding positions  obtaining a deep latch  how to properly unlatching babe  hand expression  handling and storage of expressed milk  frequency of feedings (8-12 times in 24 hr period)  how to tell babe is getting enough  feeing cues  burping techniques  anticipatory guidance for \"baby's second night\"    Feeding Plan  Continue to feed on demand when  elicits feeding cues with deep latch. Strive for 8-12 feeding sessions in a 24hr period. Will attempt to do as many feeding sessions skin to " skin as possible. Follow-up support provided and OP lactation appt scheduled.      Priya Leigh RN, RNC-OB, IBCLC  Lactation Consultant  [facility]

## 2024-07-29 NOTE — DISCHARGE INSTRUCTIONS
Community Resources:     [facility] can be reached by calling [phone]; they are located in the [facility] [address] in [place].      Allan Coy  [phone]   *food shelf   *fresh produce and bread distribution   *free hot meals   *rent assistance   *utility assistance   *lodging for homeless and transients   *winter clothing assistance   *summer day camp and youth ministry   *mens's and women's ministries   *seasonal assistance like back-to-school supplies, Coats for Kids, and Curtis help    [facility] Public Health and Human Resources can be reached at [phone]. Please call them for assistance with medical and financial assistance.          Warning Signs after Having a Baby    Keep this paper on your fridge or somewhere else where you can see it.    Call your provider if you have any of these symptoms up to 12 weeks after having your baby.    Thoughts of hurting yourself or your baby  Pain in your chest or trouble breathing  Severe headache not helped by pain medicine  Eyesight concerns (blurry vision, seeing spots or flashes of light, other changes to eyesight)  Fainting, shaking or other signs of a seizure    Call 9-1-1 if you feel that it is an emergency.     The symptoms below can happen to anyone after giving birth. They can be very serious. Call your provider if you have any of these warning signs.    My provider s phone number: _______________________    Losing too much blood (hemorrhage)    Call your provider if you soak through a pad in less than an hour or pass blood clots bigger than a golf ball. These may be signs that you are bleeding too much.    Blood clots in the legs or lungs    After you give birth, your body naturally clots its blood to help prevent blood loss. Sometimes this increased clotting can happen in other areas of the body, like the legs or lungs. This can block your blood flow and be very dangerous.      Call your provider if you:  Have a red, swollen spot on the back of your leg that is warm or painful when you touch it.   Are coughing up blood.     Infection    Call your provider if you have any of these symptoms:  Fever of 100.4 F (38 C) or higher.  Pain or redness around your stitches if you had an incision.   Any yellow, white, or green fluid coming from places where you had stitches or surgery.    Mood Problems (postpartum depression)    Many people feel sad or have mood changes after having a baby. But for some people, these mood swings are worse.     Call your provider right away if you feel so anxious or nervous that you can't care for yourself or your baby.    Preeclampsia (high blood pressure)    Even if you didn't have high blood pressure when you were pregnant, you are at risk for the high blood pressure disease called preeclampsia. This risk can last up to 12 weeks after giving birth.     Call your provider if you have:   Pain on your right side under your rib cage  Sudden swelling in the hands and face    Remember: You know your body. If something doesn't feel right, get medical help.     For informational purposes only. Not to replace the advice of your health care provider. Copyright 2020 [facility]. All rights reserved. Clinically reviewed by Justina Bee, ADALID-OB, MSN. [employer] [id number] - Rev 02/23.

## 2024-07-29 NOTE — PROGRESS NOTES
Patient:   Frances  Significant other/Pool WHITE present       Lives at: home   Lives with: Pool, children Pinky (9yr old) and Alvin (7yr old)  Support System: family, friends     Primary PCP:  Ila Vera  OB:  William  Baby PCP: William  Insurance: [employer]   Pregnancy Hx:   second pregnancy, routine visits       Agency Contacts: connected with [place], [facility]   Mental Health: anxiety, elevated score on Lovilia.  Feels like things are stable.  Previously connected with [facility].  Comfortable with reaching out to them if necessary.    Violence/Stressors: no concerns voiced/identified  Substance Abuse: denies       Adequate resources for needs (housing, utilities, food/med): needs met, receive food stamps     Transportation:  YES  Car Seat: Yes  Breast Pump:   will receive one at discharge   Formula: No  Diapers:  yes   Crib or Bassinet: has both set up     Education concerns on self/baby care:   no concerns noted while writer present.  Miguel Angel nursing during conversation     Community Resources: information for   [facility], [facility] and [facility] provided in discharge instructions.     Care team updated.

## 2024-07-29 NOTE — PLAN OF CARE
Patient discharged at 2:08 PM via ambulation accompanied by significant other and daughter and staff. Prescriptions sent to patients preferred pharmacy. All belongings sent with patient.     Discharge instructions reviewed with patient. Listed belongings gathered and returned to patient.     Patient discharged to home.   Did patient receive discharge instruction on wound care and recognition of infection symptoms? Yes. Follow up appointment made:  Yes. Home and hospital aquired medications returned to patient: Yes. Patient reports pain was well managed at discharge: Yes

## 2024-07-31 ENCOUNTER — HOSPITAL ENCOUNTER (OUTPATIENT)
Dept: OBGYN | Facility: HOSPITAL | Age: 29
Discharge: HOME | End: 2024-07-31
Attending: FAMILY MEDICINE | Admitting: FAMILY MEDICINE
Payer: MEDICAID

## 2024-07-31 PROCEDURE — G0463 HOSPITAL OUTPT CLINIC VISIT: HCPCS

## 2024-07-31 NOTE — LACTATION NOTE
Outpatient Lactation Visit    Frances Garcia                                                                                                   [id number]      Consultation Date: 2024     Reason for Lactation Referral: routine follow-up    Baby's :24    Baby's Current Age: 3d  Baby's Gestation Age: 38w 1d    Primary Care Provider: Chuy    History of Present Illness: none    MATERNAL HISTORY   History of Breast Surgery: No.  Breast Changes During Pregnancy:No  Breast Feeding History: Yes,  successful, Length of Time: 5 months  Maternal Meds:PNV and stool softener  Pregnancy complications:none  Delivery: with no complications    MATERNAL ASSESSMENT    Breast Size: average, symmetrical, soft after feeding and filling prior to feeding  Nipple Appearance - Left: healing scab education on further healing techniques provided  Nipple Appearance - Right: intact with no breakdown noted  Nipple Erectility - Left: erect with stimulation  Nipple Erectility - Right: erect with stimulation  Areolas Compressibility: soft  Nipple Size: average  Milk Supply: mature    INFANT ASSESSMENT    Oral Anatomy  Mouth: normal  Palate: normal  Jaw: normal  Tongue: normal  Frenulum: normal   Digital Suck Exam: not assessed    FEEDING   Feeding Time: 1410 for 12 minutes  Position: left breast - cradle hold  Effort to Latch: awake and alert, latched easily  Duration of Breast Feeding: Right Breast: 0; Left Breast: 12  Results: excellent breast feed    Volume of Intake:  Birth Weight: 7 lb 12.2 oz  Discharge from Hospital after delivery weight: 7 lb 5.1 oz   TODAY 2024  Pre-Weight: 7 lb 2 oz  Post-Weight: 7 lb 3 oz  Total Intake: 1 oz   Output: 2 soiled diapers in last 24 hrs, 3 wet diapers in last 24 hrs    LATCH SCORE:  Latch:2 - Good Latch  Audible Swallowin - Spontaneous & frequent  Type of Nipple:2 - Everted  Comfort:2 - Soft, Nontender  Hold:2 - No Assist  Total LATCH Score:10/10    Naked pre feeding  weight obtained. Mom able to position and latch babe well ind in cradle hold with use of boppy pillow. Once on good sucking/swallowing noted. Miguel Angel nursed well for 12 min. Miguel Angel unlatched self and is asleep at the breast, appears satisfied and retains entire feeding. Mom burped babe and naked post feeding weight obtained.     Mom stats she woke up this am with breast feeling full - thinks milk is starting to come in at this time. Miguel Angel nursing every 1-3 hours at home usually on one side only for 20-30 min. Is satisfied after feedings. Mom pumped this morning on side babbao didn't nurse on due to feeling uncomfortable.     Education reviewed and states understanding. No concerns at this time. Contact information given and encouraged to call should any questions or concerns arise.    EDUCATION   Exclusivity explained and encouraged in the early weeks to establish breastfeeding and order in milk supply.  Deep latch explained for proper positioning of breast in infant's mouth, maximizing milk transfer and comfort.  Hand expression taught and return demonstration observed with mature milk present.  Rooming-in encouraged with explanation of the benefits.  Continue to apply expressed breast milk and Lanolin cream to nipples after feedings for healing and comfort.  Postpartum breastfeeding assessment completed and education provided.  Items included in the education are:   proper positioning and latch  maternal nutritional needs  s/s of plugged ducts/mastitis  Feeding cues  sterilization and cleaning of pumping materials and bottles  effectiveness of feeding  manual expression  handling and storing breastmilk  maintenance of breastfeeding for the first 6 months  sign/symptoms of infant feeding issues requiring referral to qualified health care provider    FEEDING PLAN   Continue with current feeding plan. Feed on demand (8-12 feedings/24 hr period) when  elicits feeding cues with deep latch.       FOLLOW-UP   Reassurance  and encouragement provided in regard to mom's concerns about milk supply.  Follow-up support information provided.  Parents plan to keep [facility] Well-Child Check with [facility] for further support and monitoring.      Face-to-face Time: 60 minutes with assessment and education.    Priya Leigh RN, RNC-OB, IBCLC  Lactation Consultant  [facility]

## 2024-12-17 ENCOUNTER — TRANSFERRED RECORDS (OUTPATIENT)
Dept: HEALTH INFORMATION MANAGEMENT | Facility: CLINIC | Age: 29
End: 2024-12-17

## 2024-12-17 ENCOUNTER — MEDICAL CORRESPONDENCE (OUTPATIENT)
Dept: HEALTH INFORMATION MANAGEMENT | Facility: CLINIC | Age: 29
End: 2024-12-17

## 2024-12-19 ENCOUNTER — TELEPHONE (OUTPATIENT)
Dept: ORTHOPEDICS | Facility: OTHER | Age: 29
End: 2024-12-19

## 2025-01-16 ENCOUNTER — HOSPITAL ENCOUNTER (EMERGENCY)
Facility: HOSPITAL | Age: 30
Discharge: HOME | End: 2025-01-16
Attending: NURSE PRACTITIONER | Admitting: NURSE PRACTITIONER
Payer: COMMERCIAL

## 2025-01-16 VITALS
RESPIRATION RATE: 16 BRPM | HEIGHT: 69 IN | HEART RATE: 84 BPM | TEMPERATURE: 98.5 F | DIASTOLIC BLOOD PRESSURE: 82 MMHG | BODY MASS INDEX: 35.55 KG/M2 | OXYGEN SATURATION: 97 % | WEIGHT: 240 LBS | SYSTOLIC BLOOD PRESSURE: 120 MMHG

## 2025-01-16 DIAGNOSIS — J06.9 VIRAL URI WITH COUGH: Primary | ICD-10-CM

## 2025-01-16 LAB
FLUAV RNA SPEC QL NAA+PROBE: NEGATIVE
FLUBV RNA RESP QL NAA+PROBE: NEGATIVE
RSV RNA SPEC NAA+PROBE: NEGATIVE
SARS-COV-2 RNA RESP QL NAA+PROBE: NEGATIVE

## 2025-01-16 PROCEDURE — 99213 OFFICE O/P EST LOW 20 MIN: CPT | Performed by: NURSE PRACTITIONER

## 2025-01-16 PROCEDURE — G0463 HOSPITAL OUTPT CLINIC VISIT: HCPCS

## 2025-01-16 PROCEDURE — 87637 SARSCOV2&INF A&B&RSV AMP PRB: CPT | Performed by: NURSE PRACTITIONER

## 2025-01-16 RX ORDER — PREDNISONE 20 MG/1
TABLET ORAL
Qty: 10 TABLET | Refills: 0 | Status: SHIPPED | OUTPATIENT
Start: 2025-01-16

## 2025-01-16 ASSESSMENT — ENCOUNTER SYMPTOMS
CHILLS: 0
SHORTNESS OF BREATH: 1
RHINORRHEA: 1
FEVER: 0
COUGH: 1

## 2025-01-16 NOTE — DISCHARGE INSTRUCTIONS
Your lungs sound clear today.  I prescribed some steroids for you to take with concern for a an asthma flareup due to a viral upper respiratory infection.  Use your inhaler as needed.    We will notify you of your results when available.    Follow-up with your primary doctor if no improvement in symptoms.  Return to urgent care or emergency room if any worsening or concerning symptoms.

## 2025-01-16 NOTE — ED PROVIDER NOTES
"  History     Chief Complaint   Patient presents with    Cold Symptoms     HPI  Frances aGrcia is a 29 year old female who presents to urgent care for evaluation of URI symptoms that started 3 days ago.  She reports a cough, rhinorrhea, \"body shocks\", feeling cold when she breathes in and chills.  This is accompanied by some chest pain and shortness of breath.  She has been taking some over-the-counter honey cough syrup.  History of asthma and has a rescue inhaler at home.  No current tobacco use.  Other members in her family are sick with similar symptoms.    Allergies:  Allergies   Allergen Reactions    Dust Mites        Problem List:    Patient Active Problem List    Diagnosis Date Noted    Full-term premature rupture of membranes with onset of labor within 24 hours of rupture 07/26/2024     Priority: Medium    PROM (premature rupture of membranes) 07/26/2024     Priority: Medium    Normal labor and delivery 07/26/2024     Priority: Medium    Encounter for triage in pregnant patient 07/13/2024     Priority: Medium    Asthma 07/25/2006     Priority: Medium     Formatting of this note might be different from the original.   IMO Update 10 2016          Past Medical History:    Past Medical History:   Diagnosis Date    Migraine     Uncomplicated asthma        Past Surgical History:    Past Surgical History:   Procedure Laterality Date    ESOPHAGOSCOPY, GASTROSCOPY, DUODENOSCOPY (EGD), COMBINED N/A 11/6/2015    Procedure: COMBINED ESOPHAGOSCOPY, GASTROSCOPY, DUODENOSCOPY (EGD);  Surgeon: Gregory Boyce MD;  Location: [facility] OR       Family History:    History reviewed. No pertinent family history.    Social History:  Marital Status:  Single [1]  Social History     Tobacco Use    Smoking status: Never    Smokeless tobacco: Never   Substance Use Topics    Alcohol use: No    Drug use: No        Medications:    ALBUTEROL IN  docusate sodium (COLACE) 100 MG capsule  ibuprofen (ADVIL/MOTRIN) 800 MG " "tablet  predniSONE (DELTASONE) 20 MG tablet  Prenatal MV & Min w/FA-DHA (PRENATAL GUMMIES) 0.18-25 MG CHEW  triamcinolone (KENALOG) 0.025 % external ointment          Review of Systems   Constitutional:  Negative for chills and fever.   HENT:  Positive for rhinorrhea.    Respiratory:  Positive for cough and shortness of breath.    Cardiovascular:  Positive for chest pain.   All other systems reviewed and are negative.      Physical Exam   BP: 120/82  Pulse: 84  Temp: 98.5  F (36.9  C)  Resp: 16  Height: 175.3 cm (5' 9\")  Weight: 108.9 kg (240 lb)  SpO2: 97 %      Physical Exam  Vitals and nursing note reviewed.   Constitutional:       General: She is not in acute distress.     Appearance: Normal appearance. She is well-developed. She is not diaphoretic.   HENT:      Head: Normocephalic and atraumatic.      Right Ear: Tympanic membrane and ear canal normal.      Left Ear: Tympanic membrane and ear canal normal.      Mouth/Throat:      Mouth: Mucous membranes are moist.      Pharynx: No posterior oropharyngeal erythema.   Eyes:      Conjunctiva/sclera: Conjunctivae normal.   Cardiovascular:      Rate and Rhythm: Normal rate and regular rhythm.      Heart sounds: Normal heart sounds.   Pulmonary:      Effort: Pulmonary effort is normal. No respiratory distress.      Breath sounds: Normal breath sounds. No stridor. No wheezing, rhonchi or rales.   Abdominal:      General: Abdomen is flat.   Musculoskeletal:      Cervical back: Normal range of motion and neck supple.   Skin:     General: Skin is warm and dry.      Coloration: Skin is not pale.   Neurological:      Mental Status: She is alert and oriented to person, place, and time.         ED Course        Procedures            No results found for this or any previous visit (from the past 24 hours).    Medications - No data to display    Assessments & Plan (with Medical Decision Making)   29-year-old female with URI symptoms x 3 days.  History of asthma.  Reports some " shortness of breath and chest pain.  Her respirations are even and unlabored.  She was talking in full sentences.  Her vital signs were within normal limits.    Findings consistent with viral upper respiratory infection with possible mild asthma exacerbation.  Respiratory viral panel results pending.  Will treat with prednisone for her asthma.  She is encouraged to use her albuterol inhaler as needed for shortness of breath or if she starts wheezing.  Continue with over-the-counter cough cold medications.  Follow-up with primary doctor if no improvement in symptoms.  Return to urgent care or Emergency Department for any worsening or concerning symptoms.    I have reviewed the nursing notes.    I have reviewed the findings, diagnosis, plan and need for follow up with the patient.  This document was prepared using a combination of typing and voice generated software.  While every attempt was made for accuracy, spelling and grammatical errors may exist.         New Prescriptions    PREDNISONE (DELTASONE) 20 MG TABLET    Take two tablets (= 40mg) each day for 5 (five) days       Final diagnoses:   Viral URI with cough       1/16/2025   [facility] EM[facility]       Mpofu, Prudence, CNP  01/16/25 1700

## 2025-01-16 NOTE — ED TRIAGE NOTES
"Pt presents with concerns of cough, \"it feels cold when I breath in\" and \"body shocks\"        "

## 2025-02-12 ENCOUNTER — OFFICE VISIT (OUTPATIENT)
Dept: ORTHOPEDICS | Facility: OTHER | Age: 30
End: 2025-02-12
Attending: ORTHOPAEDIC SURGERY
Payer: COMMERCIAL

## 2025-02-12 ENCOUNTER — ANCILLARY PROCEDURE (OUTPATIENT)
Dept: GENERAL RADIOLOGY | Facility: OTHER | Age: 30
End: 2025-02-12
Attending: ORTHOPAEDIC SURGERY
Payer: COMMERCIAL

## 2025-02-12 VITALS
BODY MASS INDEX: 35.45 KG/M2 | HEART RATE: 76 BPM | RESPIRATION RATE: 18 BRPM | WEIGHT: 240.08 LBS | SYSTOLIC BLOOD PRESSURE: 125 MMHG | OXYGEN SATURATION: 97 % | DIASTOLIC BLOOD PRESSURE: 83 MMHG

## 2025-02-12 DIAGNOSIS — M25.531 RIGHT WRIST PAIN: ICD-10-CM

## 2025-02-12 DIAGNOSIS — M25.531 RIGHT WRIST PAIN: Primary | ICD-10-CM

## 2025-02-12 PROCEDURE — 73110 X-RAY EXAM OF WRIST: CPT | Mod: TC,RT

## 2025-02-12 RX ORDER — FAMOTIDINE 20 MG/1
1 TABLET, FILM COATED ORAL 2 TIMES DAILY
COMMUNITY
Start: 2024-05-14

## 2025-02-12 RX ORDER — PYRIDOXINE HCL (VITAMIN B6) 25 MG
25 TABLET ORAL
COMMUNITY
Start: 2024-03-13

## 2025-02-12 RX ORDER — METRONIDAZOLE 500 MG/1
TABLET ORAL
COMMUNITY
Start: 2024-11-08

## 2025-02-12 RX ORDER — NORGESTIMATE AND ETHINYL ESTRADIOL 7DAYSX3 28
KIT ORAL
COMMUNITY
Start: 2024-12-06

## 2025-02-12 ASSESSMENT — PAIN SCALES - GENERAL: PAINLEVEL_OUTOF10: SEVERE PAIN (9)

## 2025-02-14 PROBLEM — M67.40 GANGLION: Status: ACTIVE | Noted: 2025-02-14

## 2025-07-06 ENCOUNTER — APPOINTMENT (OUTPATIENT)
Dept: ULTRASOUND IMAGING | Facility: HOSPITAL | Age: 30
End: 2025-07-06
Attending: PHYSICIAN ASSISTANT
Payer: COMMERCIAL

## 2025-07-06 ENCOUNTER — HOSPITAL ENCOUNTER (EMERGENCY)
Facility: HOSPITAL | Age: 30
Discharge: HOME | End: 2025-07-06
Attending: PHYSICIAN ASSISTANT
Payer: COMMERCIAL

## 2025-07-06 VITALS
DIASTOLIC BLOOD PRESSURE: 74 MMHG | HEART RATE: 83 BPM | BODY MASS INDEX: 34.87 KG/M2 | RESPIRATION RATE: 16 BRPM | SYSTOLIC BLOOD PRESSURE: 137 MMHG | OXYGEN SATURATION: 99 % | WEIGHT: 236.11 LBS | TEMPERATURE: 98.4 F

## 2025-07-06 DIAGNOSIS — R17 ELEVATED BILIRUBIN: ICD-10-CM

## 2025-07-06 DIAGNOSIS — K83.09 ACUTE CHOLANGITIS (H): Primary | ICD-10-CM

## 2025-07-06 LAB
ALBUMIN SERPL BCG-MCNC: 4 G/DL (ref 3.5–5.2)
ALBUMIN UR-MCNC: 20 MG/DL
ALP SERPL-CCNC: 284 U/L (ref 40–150)
ALT SERPL W P-5'-P-CCNC: 453 U/L (ref 0–50)
ANION GAP SERPL CALCULATED.3IONS-SCNC: 16 MMOL/L (ref 7–15)
APPEARANCE UR: ABNORMAL
AST SERPL W P-5'-P-CCNC: 204 U/L (ref 0–45)
BASOPHILS # BLD AUTO: 0 10E3/UL (ref 0–0.2)
BASOPHILS NFR BLD AUTO: 0 %
BILIRUB SERPL-MCNC: 7.5 MG/DL
BILIRUB UR QL STRIP: ABNORMAL
BUN SERPL-MCNC: 10.8 MG/DL (ref 6–20)
CALCIUM SERPL-MCNC: 9.4 MG/DL (ref 8.8–10.4)
CHLORIDE SERPL-SCNC: 101 MMOL/L (ref 98–107)
COLOR UR AUTO: ABNORMAL
CREAT SERPL-MCNC: 0.55 MG/DL (ref 0.51–0.95)
CRP SERPL-MCNC: 62.49 MG/L
EGFRCR SERPLBLD CKD-EPI 2021: >90 ML/MIN/1.73M2
EOSINOPHIL # BLD AUTO: 0.1 10E3/UL (ref 0–0.7)
EOSINOPHIL NFR BLD AUTO: 1 %
ERYTHROCYTE [DISTWIDTH] IN BLOOD BY AUTOMATED COUNT: 11.4 % (ref 10–15)
GLUCOSE SERPL-MCNC: 114 MG/DL (ref 70–99)
GLUCOSE UR STRIP-MCNC: NEGATIVE MG/DL
HCG INTACT+B SERPL-ACNC: <1 MIU/ML
HCO3 SERPL-SCNC: 20 MMOL/L (ref 22–29)
HCT VFR BLD AUTO: 39.4 % (ref 35–47)
HGB BLD-MCNC: 13.6 G/DL (ref 11.7–15.7)
HGB UR QL STRIP: NEGATIVE
HOLD SPECIMEN: NORMAL
IMM GRANULOCYTES # BLD: 0 10E3/UL
IMM GRANULOCYTES NFR BLD: 1 %
INR PPP: 1.11 (ref 0.85–1.15)
KETONES UR STRIP-MCNC: 80 MG/DL
LEUKOCYTE ESTERASE UR QL STRIP: ABNORMAL
LIPASE SERPL-CCNC: 20 U/L (ref 13–60)
LYMPHOCYTES # BLD AUTO: 1 10E3/UL (ref 0.8–5.3)
LYMPHOCYTES NFR BLD AUTO: 12 %
MCH RBC QN AUTO: 29.8 PG (ref 26.5–33)
MCHC RBC AUTO-ENTMCNC: 34.5 G/DL (ref 31.5–36.5)
MCV RBC AUTO: 86 FL (ref 78–100)
MONOCYTES # BLD AUTO: 0.6 10E3/UL (ref 0–1.3)
MONOCYTES NFR BLD AUTO: 7 %
MUCOUS THREADS #/AREA URNS LPF: PRESENT /LPF
NEUTROPHILS # BLD AUTO: 6.6 10E3/UL (ref 1.6–8.3)
NEUTROPHILS NFR BLD AUTO: 79 %
NITRATE UR QL: NEGATIVE
NRBC # BLD AUTO: 0 10E3/UL
NRBC BLD AUTO-RTO: 0 /100
PH UR STRIP: 6 [PH] (ref 4.7–8)
PLATELET # BLD AUTO: 325 10E3/UL (ref 150–450)
POTASSIUM SERPL-SCNC: 3.9 MMOL/L (ref 3.4–5.3)
PROT SERPL-MCNC: 7.3 G/DL (ref 6.4–8.3)
PROTHROMBIN TIME: 14.4 SECONDS (ref 11.8–14.8)
RBC # BLD AUTO: 4.57 10E6/UL (ref 3.8–5.2)
RBC URINE: 1 /HPF
SODIUM SERPL-SCNC: 137 MMOL/L (ref 135–145)
SP GR UR STRIP: 1.03 (ref 1–1.03)
SPERM #/AREA URNS HPF: PRESENT /HPF
SQUAMOUS EPITHELIAL: 10 /HPF
UROBILINOGEN UR STRIP-MCNC: 8 MG/DL
WBC # BLD AUTO: 8.4 10E3/UL (ref 4–11)
WBC URINE: 11 /HPF

## 2025-07-06 PROCEDURE — 85004 AUTOMATED DIFF WBC COUNT: CPT | Performed by: PHYSICIAN ASSISTANT

## 2025-07-06 PROCEDURE — 258N000003 HC RX IP 258 OP 636: Performed by: PHYSICIAN ASSISTANT

## 2025-07-06 PROCEDURE — 86140 C-REACTIVE PROTEIN: CPT | Performed by: PHYSICIAN ASSISTANT

## 2025-07-06 PROCEDURE — 96375 TX/PRO/DX INJ NEW DRUG ADDON: CPT

## 2025-07-06 PROCEDURE — 83690 ASSAY OF LIPASE: CPT | Performed by: NURSE PRACTITIONER

## 2025-07-06 PROCEDURE — 85004 AUTOMATED DIFF WBC COUNT: CPT | Performed by: STUDENT IN AN ORGANIZED HEALTH CARE EDUCATION/TRAINING PROGRAM

## 2025-07-06 PROCEDURE — 85610 PROTHROMBIN TIME: CPT | Performed by: PHYSICIAN ASSISTANT

## 2025-07-06 PROCEDURE — 96376 TX/PRO/DX INJ SAME DRUG ADON: CPT

## 2025-07-06 PROCEDURE — 76705 ECHO EXAM OF ABDOMEN: CPT | Mod: 26 | Performed by: RADIOLOGY

## 2025-07-06 PROCEDURE — 250N000011 HC RX IP 250 OP 636: Performed by: PHYSICIAN ASSISTANT

## 2025-07-06 PROCEDURE — 96361 HYDRATE IV INFUSION ADD-ON: CPT

## 2025-07-06 PROCEDURE — 87040 BLOOD CULTURE FOR BACTERIA: CPT | Performed by: PHYSICIAN ASSISTANT

## 2025-07-06 PROCEDURE — 99284 EMERGENCY DEPT VISIT MOD MDM: CPT | Performed by: PHYSICIAN ASSISTANT

## 2025-07-06 PROCEDURE — 76705 ECHO EXAM OF ABDOMEN: CPT

## 2025-07-06 PROCEDURE — 96365 THER/PROPH/DIAG IV INF INIT: CPT

## 2025-07-06 PROCEDURE — 99285 EMERGENCY DEPT VISIT HI MDM: CPT | Mod: 25

## 2025-07-06 PROCEDURE — 81001 URINALYSIS AUTO W/SCOPE: CPT | Performed by: NURSE PRACTITIONER

## 2025-07-06 PROCEDURE — 36415 COLL VENOUS BLD VENIPUNCTURE: CPT | Performed by: PHYSICIAN ASSISTANT

## 2025-07-06 PROCEDURE — 36415 COLL VENOUS BLD VENIPUNCTURE: CPT | Performed by: STUDENT IN AN ORGANIZED HEALTH CARE EDUCATION/TRAINING PROGRAM

## 2025-07-06 PROCEDURE — 87086 URINE CULTURE/COLONY COUNT: CPT | Performed by: NURSE PRACTITIONER

## 2025-07-06 PROCEDURE — 80053 COMPREHEN METABOLIC PANEL: CPT | Performed by: PHYSICIAN ASSISTANT

## 2025-07-06 PROCEDURE — 84702 CHORIONIC GONADOTROPIN TEST: CPT | Performed by: PHYSICIAN ASSISTANT

## 2025-07-06 RX ORDER — ONDANSETRON 2 MG/ML
4 INJECTION INTRAMUSCULAR; INTRAVENOUS ONCE
Status: COMPLETED | OUTPATIENT
Start: 2025-07-06 | End: 2025-07-06

## 2025-07-06 RX ORDER — PIPERACILLIN SODIUM, TAZOBACTAM SODIUM 4; .5 G/20ML; G/20ML
4.5 INJECTION, POWDER, LYOPHILIZED, FOR SOLUTION INTRAVENOUS ONCE
Status: COMPLETED | OUTPATIENT
Start: 2025-07-06 | End: 2025-07-06

## 2025-07-06 RX ORDER — HYDROMORPHONE HYDROCHLORIDE 1 MG/ML
0.5 INJECTION, SOLUTION INTRAMUSCULAR; INTRAVENOUS; SUBCUTANEOUS ONCE
Refills: 0 | Status: COMPLETED | OUTPATIENT
Start: 2025-07-06 | End: 2025-07-06

## 2025-07-06 RX ADMIN — HYDROMORPHONE HYDROCHLORIDE 0.5 MG: 1 INJECTION, SOLUTION INTRAMUSCULAR; INTRAVENOUS; SUBCUTANEOUS at 15:22

## 2025-07-06 RX ADMIN — ONDANSETRON 4 MG: 2 INJECTION INTRAMUSCULAR; INTRAVENOUS at 15:20

## 2025-07-06 RX ADMIN — PIPERACILLIN AND TAZOBACTAM 4.5 G: 4; .5 INJECTION, POWDER, FOR SOLUTION INTRAVENOUS at 18:00

## 2025-07-06 RX ADMIN — HYDROMORPHONE HYDROCHLORIDE 0.5 MG: 1 INJECTION, SOLUTION INTRAMUSCULAR; INTRAVENOUS; SUBCUTANEOUS at 18:35

## 2025-07-06 RX ADMIN — SODIUM CHLORIDE 1000 ML: 9 INJECTION, SOLUTION INTRAVENOUS at 15:20

## 2025-07-06 ASSESSMENT — COLUMBIA-SUICIDE SEVERITY RATING SCALE - C-SSRS
2. HAVE YOU ACTUALLY HAD ANY THOUGHTS OF KILLING YOURSELF IN THE PAST MONTH?: NO
6. HAVE YOU EVER DONE ANYTHING, STARTED TO DO ANYTHING, OR PREPARED TO DO ANYTHING TO END YOUR LIFE?: NO
1. IN THE PAST MONTH, HAVE YOU WISHED YOU WERE DEAD OR WISHED YOU COULD GO TO SLEEP AND NOT WAKE UP?: NO

## 2025-07-06 ASSESSMENT — ENCOUNTER SYMPTOMS
RECTAL PAIN: 0
FEVER: 0
DIFFICULTY URINATING: 0
FLANK PAIN: 1
COUGH: 0
NAUSEA: 1
SHORTNESS OF BREATH: 0
VOMITING: 1
FATIGUE: 1
ABDOMINAL PAIN: 1
DIARRHEA: 0
DYSURIA: 0
APPETITE CHANGE: 1

## 2025-07-06 ASSESSMENT — ACTIVITIES OF DAILY LIVING (ADL)
ADLS_ACUITY_SCORE: 52

## 2025-07-06 NOTE — ED TRIAGE NOTES
Patient presents with complaints of abdominal pain for the last 4 days it is upper right and she states it sort of comes and goes and when it's bad it's in her back as well and she throws up too. She denies having much of an appetite for the last week as well.      Triage Assessment (Adult)       Row Name 07/06/25 1404          Triage Assessment    Airway WDL WDL        Respiratory WDL    Respiratory WDL WDL        Cognitive/Neuro/Behavioral WDL    Cognitive/Neuro/Behavioral WDL WDL

## 2025-07-06 NOTE — ED PROVIDER NOTES
History     Chief Complaint   Patient presents with    Abdominal Pain     HPI  Frances Garcia is a 29 year old female with PMH PROM, asthma who presents to ED for evaluation of abdominal pain with nausea.  She reports that she has been intermittently noticing some pain to her right upper quadrant and right flank over the last month.  In the last roughly 4 days it has become quite persistent and rated around 5 out of 10 in severity at baseline.  After eating the pain will become much worse.  She feels persistently nauseous and has vomited multiple times.  She last ate a small piece of cookie about 1 hour prior to arrival (around noon today).    She reports no BM in the last 5 days.  No urinary changes.  No prior abdominal surgical history.  No trauma.  No chest pain or dyspnea.  No known fevers.  She reports normal menstrual cycle last week and is on OCPs, therefore believes pregnancy is not likely.    Allergies:  Allergies   Allergen Reactions    Dust Mites        Problem List:    Patient Active Problem List    Diagnosis Date Noted    Ganglion 02/14/2025     Priority: Medium    Full-term premature rupture of membranes with onset of labor within 24 hours of rupture 07/26/2024     Priority: Medium    PROM (premature rupture of membranes) 07/26/2024     Priority: Medium    Normal labor and delivery 07/26/2024     Priority: Medium    Encounter for triage in pregnant patient 07/13/2024     Priority: Medium    Asthma 07/25/2006     Priority: Medium     Formatting of this note might be different from the original.   IMO Update 10 2016          Past Medical History:    Past Medical History:   Diagnosis Date    Migraine     Uncomplicated asthma        Past Surgical History:    Past Surgical History:   Procedure Laterality Date    ESOPHAGOSCOPY, GASTROSCOPY, DUODENOSCOPY (EGD), COMBINED N/A 11/6/2015    Procedure: COMBINED ESOPHAGOSCOPY, GASTROSCOPY, DUODENOSCOPY (EGD);  Surgeon: Gregory Boyce MD;  Location: [facility]  OR       Family History:    History reviewed. No pertinent family history.    Social History:  Marital Status:  Single [1]  Social History     Tobacco Use    Smoking status: Never    Smokeless tobacco: Never   Substance Use Topics    Alcohol use: No    Drug use: No        Medications:    ALBUTEROL IN  doxylamine (UNISOM) 25 MG TABS tablet  famotidine (PEPCID) 20 MG tablet  ibuprofen (ADVIL/MOTRIN) 800 MG tablet  norgestim-eth estrad triphasic (ORTHO TRI-CYCLEN) 0.18/0.215/0.25 MG-35 MCG tablet          Review of Systems   Constitutional:  Positive for appetite change and fatigue. Negative for fever.   Respiratory:  Negative for cough and shortness of breath.    Gastrointestinal:  Positive for abdominal pain, nausea and vomiting. Negative for diarrhea and rectal pain.   Genitourinary:  Positive for flank pain. Negative for difficulty urinating and dysuria.   Neurological:  Negative for syncope.       Physical Exam   BP: 125/89  Pulse: 105  Temp: 98.4  F (36.9  C)  Resp: 18  Weight: 107.1 kg (236 lb 1.8 oz)  SpO2: 98 %      Physical Exam  Vitals and nursing note reviewed.   Constitutional:       General: She is not in acute distress.     Appearance: She is well-developed. She is diaphoretic (mildly). She is not toxic-appearing.   HENT:      Head: Normocephalic and atraumatic.   Eyes:      General: Scleral icterus present.      Extraocular Movements: Extraocular movements intact.      Pupils: Pupils are equal, round, and reactive to light.   Cardiovascular:      Rate and Rhythm: Regular rhythm. Tachycardia present.      Heart sounds: Normal heart sounds.      Comments: Mild tachycardia on arrival  Pulmonary:      Effort: Pulmonary effort is normal. No respiratory distress.      Breath sounds: Normal breath sounds. No wheezing.   Abdominal:      General: Abdomen is flat. Bowel sounds are normal.      Palpations: Abdomen is soft. There is no fluid wave.      Tenderness: There is abdominal tenderness in the right upper  quadrant. There is no right CVA tenderness or left CVA tenderness. Positive signs include Olmos's sign.   Skin:     Coloration: Skin is jaundiced.      Findings: No rash.   Neurological:      General: No focal deficit present.      Mental Status: She is alert and oriented to person, place, and time.   Psychiatric:         Mood and Affect: Mood normal.         Behavior: Behavior normal.         ED Course     ED Course as of 07/06/25 1924   Sun Jul 06, 2025   1421 Labs ordered while patient in lobby.   1441 Bilirubin Total(!): 7.5   1441 CRP Inflammation(!): 62.49   1449 Lipase: 20   1516 INR: 1.11   1552 Patient reports near complete resolution of pain   1906 Glucose Urine: Negative   1906 Leukocyte Esterase Urine(!): Moderate   1906 WBC Urine(!): 11  Urine suggests possible infection; she is on Zosyn and this will provide adequate coverage. Urine culture is pending.    1907 Bilirubin Urine(!): Large     Procedures         Recent Results (from the past 24 hours)   CBC with platelets differential    Narrative    The following orders were created for panel order CBC with platelets differential.  Procedure                               Abnormality         Status                     ---------                               -----------         ------                     CBC with platelets and ...[[id number]]                      Final result                 Please view results for these tests on the individual orders.   Comprehensive metabolic panel   Result Value Ref Range    Sodium 137 135 - 145 mmol/L    Potassium 3.9 3.4 - 5.3 mmol/L    Carbon Dioxide (CO2) 20 (L) 22 - 29 mmol/L    Anion Gap 16 (H) 7 - 15 mmol/L    Urea Nitrogen 10.8 6.0 - 20.0 mg/dL    Creatinine 0.55 0.51 - 0.95 mg/dL    GFR Estimate >90 >60 mL/min/1.73m2    Calcium 9.4 8.8 - 10.4 mg/dL    Chloride 101 98 - 107 mmol/L    Glucose 114 (H) 70 - 99 mg/dL    Alkaline Phosphatase 284 (H) 40 - 150 U/L     (H) 0 - 45 U/L     (H) 0 - 50 U/L     Protein Total 7.3 6.4 - 8.3 g/dL    Albumin 4.0 3.5 - 5.2 g/dL    Bilirubin Total 7.5 (H) <=1.2 mg/dL   CRP inflammation   Result Value Ref Range    CRP Inflammation 62.49 (H) <5.00 mg/L   [place] Draw    Narrative    The following orders were created for panel order [place] Draw.  Procedure                               Abnormality         Status                     ---------                               -----------         ------                     Extra Blue Top Tube[[id number]]                             Final result               Extra Red Top Tube[[id number]]                              Final result               Extra Green Top (Lithiu...[[id number]]                                                 Extra Purple Top Tube[[id number]]                                                      Extra Green Top (Lithiu...[[id number]]                      Final result                 Please view results for these tests on the individual orders.   CBC with platelets and differential   Result Value Ref Range    WBC Count 8.4 4.0 - 11.0 10e3/uL    RBC Count 4.57 3.80 - 5.20 10e6/uL    Hemoglobin 13.6 11.7 - 15.7 g/dL    Hematocrit 39.4 35.0 - 47.0 %    MCV 86 78 - 100 fL    MCH 29.8 26.5 - 33.0 pg    MCHC 34.5 31.5 - 36.5 g/dL    RDW 11.4 10.0 - 15.0 %    Platelet Count 325 150 - 450 10e3/uL    % Neutrophils 79 %    % Lymphocytes 12 %    % Monocytes 7 %    % Eosinophils 1 %    % Basophils 0 %    % Immature Granulocytes 1 %    NRBCs per 100 WBC 0 <1 /100    Absolute Neutrophils 6.6 1.6 - 8.3 10e3/uL    Absolute Lymphocytes 1.0 0.8 - 5.3 10e3/uL    Absolute Monocytes 0.6 0.0 - 1.3 10e3/uL    Absolute Eosinophils 0.1 0.0 - 0.7 10e3/uL    Absolute Basophils 0.0 0.0 - 0.2 10e3/uL    Absolute Immature Granulocytes 0.0 <=0.4 10e3/uL    Absolute NRBCs 0.0 10e3/uL   Extra Blue Top Tube   Result Value Ref Range    Hold Specimen JIC    Extra Red Top Tube   Result Value Ref Range    Hold Specimen JIC    Extra Green Top (Lithium  Heparin) ON ICE   Result Value Ref Range    Hold Specimen JIC    Lipase   Result Value Ref Range    Lipase 20 13 - 60 U/L   INR   Result Value Ref Range    INR 1.11 0.85 - 1.15    PT 14.4 11.8 - 14.8 Seconds   HCG quantitative pregnancy (blood)   Result Value Ref Range    hCG Quantitative <1 <5 mIU/mL   US Abdomen Limited    Narrative    EXAM: US ABDOMEN LIMITED  LOCATION: [facility]  DATE: 7/6/2025    INDICATION: RUQ gallbladder suspected  COMPARISON: None.  TECHNIQUE: Limited abdominal ultrasound.    FINDINGS:    GALLBLADDER: There are stones in the gallbladder. The gallbladder wall is thickened and edematous measuring up to 7 mm. No pericholecystic fluid. No sonographic Olmos's sign in this medicated patient.    BILE DUCTS: The common bile duct is dilated without cause of obstruction seen. The common duct measures 9 mm.    LIVER: Normal parenchyma with smooth contour. No focal mass. The portal vein is patent with flow in the normal direction.    RIGHT KIDNEY: No hydronephrosis.    PANCREAS: The visualized portions are normal.    No ascites.      Impression    IMPRESSION:  1.  Cholelithiasis. The gallbladder wall is thickened and edematous and cholecystitis is a possibility.  2.  Dilated common bile duct without cause of obstruction seen.       UA with Microscopic reflex to Culture    Specimen: Urine, Clean Catch   Result Value Ref Range    Color Urine Dark Yellow (A) Colorless, Straw, Light Yellow, Yellow    Appearance Urine Slightly Cloudy (A) Clear    Glucose Urine Negative Negative mg/dL    Bilirubin Urine Large (A) Negative    Ketones Urine 80 (A) Negative mg/dL    Specific Gravity Urine 1.026 1.003 - 1.035    Blood Urine Negative Negative    pH Urine 6.0 4.7 - 8.0    Protein Albumin Urine 20 (A) Negative mg/dL    Urobilinogen Urine 8.0 (A) Normal mg/dL    Nitrite Urine Negative Negative    Leukocyte Esterase Urine Moderate (A) Negative    Mucus Urine Present (A) None Seen /LPF    Sperm Urine  Present (A) None Seen /HPF    RBC Urine 1 <=2 /HPF    WBC Urine 11 (H) <=5 /HPF    Squamous Epithelials Urine 10 (H) <=1 /HPF    Narrative    Urine Culture ordered based on laboratory criteria       Medications   sodium chloride 0.9% BOLUS 1,000 mL (0 mLs Intravenous Stopped 7/6/25 1605)   ondansetron (ZOFRAN) injection 4 mg (4 mg Intravenous $Given 7/6/25 1520)   HYDROmorphone (PF) (DILAUDID) injection 0.5 mg (0.5 mg Intravenous $Given 7/6/25 1522)   piperacillin-tazobactam (ZOSYN) 4.5 g vial to attach to  mL bag (0 g Intravenous Stopped 7/6/25 1832)   HYDROmorphone (PF) (DILAUDID) injection 0.5 mg (0.5 mg Intravenous $Given 7/6/25 1835)       Assessments & Plan (with Medical Decision Making)     I have reviewed the nursing notes.    I have reviewed the findings, diagnosis, plan and need for follow up with the patient.  29-year-old female presents with 4 days of right upper quadrant and right flank pain worsened postprandially, nausea and vomiting.  She is afebrile on arrival.  Differential diagnosis broad and inclusive of cholecystitis, acute cholangitis, choledocholithiasis, pyelonephritis, ureteral obstruction, appendicitis.   Serologic tests were ordered from waiting area.  She was found to have bilirubin at 7.5, consistent with examination revealing jaundice and scleral icterus.  She had a positive Olmos sign on exam.  Lab test revealed transaminitis.  Ultrasound imaging was pursued.  Initial  images showed thickened gallbladder wall as well as common bile duct dilatation.  Formal read was pending.  Did order Zosyn for empiric coverage of intraabdominal pathology.   Given concerns for elevated bilirubin and meeting diagnostic criteria for mild cholangitis, this patient will be likely to require ERCP/MRCP. This is not available at this hospital setting. Patient expresses preference to [facility]. Spoke with intake physician who graciously accepted further care of this patient. She did require a second dose  of hydromorphone but otherwise her pain remained controlled and she remained stable. She was transferred in stable condition.     New Prescriptions    No medications on file       Final diagnoses:   Elevated bilirubin   Acute cholangitis ([facility])       7/6/2025   [facility]       Ansley Walker PA-C  07/06/25 1921

## 2025-07-08 LAB — BACTERIA UR CULT: NORMAL

## 2025-07-10 LAB
BACTERIA SPEC CULT: NORMAL
BACTERIA SPEC CULT: NORMAL

## 2025-07-12 LAB
BACTERIA SPEC CULT: NO GROWTH
BACTERIA SPEC CULT: NO GROWTH

## 2025-07-13 ENCOUNTER — HEALTH MAINTENANCE LETTER (OUTPATIENT)
Age: 30
End: 2025-07-13

## 2025-07-24 ENCOUNTER — OFFICE VISIT (OUTPATIENT)
Dept: FAMILY MEDICINE | Facility: OTHER | Age: 30
End: 2025-07-24
Attending: NURSE PRACTITIONER
Payer: COMMERCIAL

## 2025-07-24 VITALS
SYSTOLIC BLOOD PRESSURE: 118 MMHG | HEART RATE: 78 BPM | RESPIRATION RATE: 16 BRPM | DIASTOLIC BLOOD PRESSURE: 70 MMHG | WEIGHT: 236.3 LBS | OXYGEN SATURATION: 98 % | TEMPERATURE: 98.1 F | HEIGHT: 69 IN | BODY MASS INDEX: 35 KG/M2

## 2025-07-24 DIAGNOSIS — K83.09 CHOLECYSTITIS WITH CHOLANGITIS (H): Primary | ICD-10-CM

## 2025-07-24 DIAGNOSIS — K81.9 CHOLECYSTITIS WITH CHOLANGITIS (H): Primary | ICD-10-CM

## 2025-07-24 PROBLEM — A41.9 SEPSIS WITH ACUTE ORGAN DYSFUNCTION WITHOUT SEPTIC SHOCK (H): Status: RESOLVED | Noted: 2025-07-06 | Resolved: 2025-07-24

## 2025-07-24 PROBLEM — K80.01 CALCULUS OF GALLBLADDER WITH ACUTE CHOLECYSTITIS AND OBSTRUCTION: Status: RESOLVED | Noted: 2025-07-06 | Resolved: 2025-07-24

## 2025-07-24 PROBLEM — M67.40 GANGLION: Status: RESOLVED | Noted: 2025-02-14 | Resolved: 2025-07-24

## 2025-07-24 PROBLEM — Z36.89 ENCOUNTER FOR TRIAGE IN PREGNANT PATIENT: Status: RESOLVED | Noted: 2024-07-13 | Resolved: 2025-07-24

## 2025-07-24 PROBLEM — K80.50 CHOLEDOCHOLITHIASIS: Status: RESOLVED | Noted: 2025-07-06 | Resolved: 2025-07-24

## 2025-07-24 PROBLEM — O42.90 PROM (PREMATURE RUPTURE OF MEMBRANES): Status: RESOLVED | Noted: 2024-07-26 | Resolved: 2025-07-24

## 2025-07-24 PROBLEM — R65.20 SEPSIS WITH ACUTE ORGAN DYSFUNCTION WITHOUT SEPTIC SHOCK (H): Status: RESOLVED | Noted: 2025-07-06 | Resolved: 2025-07-24

## 2025-07-24 PROBLEM — K76.9 DISORDER OF LIVER: Status: ACTIVE | Noted: 2025-07-07

## 2025-07-24 LAB
ALBUMIN SERPL BCG-MCNC: 4.3 G/DL (ref 3.5–5.2)
ALP SERPL-CCNC: 106 U/L (ref 40–150)
ALT SERPL W P-5'-P-CCNC: 41 U/L (ref 0–50)
ANION GAP SERPL CALCULATED.3IONS-SCNC: 11 MMOL/L (ref 7–15)
AST SERPL W P-5'-P-CCNC: 26 U/L (ref 0–45)
BILIRUB SERPL-MCNC: 0.6 MG/DL
BUN SERPL-MCNC: 15.8 MG/DL (ref 6–20)
CALCIUM SERPL-MCNC: 9.2 MG/DL (ref 8.8–10.4)
CHLORIDE SERPL-SCNC: 106 MMOL/L (ref 98–107)
CREAT SERPL-MCNC: 0.73 MG/DL (ref 0.51–0.95)
CRP SERPL-MCNC: <3 MG/L
EGFRCR SERPLBLD CKD-EPI 2021: >90 ML/MIN/1.73M2
GLUCOSE SERPL-MCNC: 79 MG/DL (ref 70–99)
HCO3 SERPL-SCNC: 24 MMOL/L (ref 22–29)
POTASSIUM SERPL-SCNC: 3.9 MMOL/L (ref 3.4–5.3)
PROT SERPL-MCNC: 7.1 G/DL (ref 6.4–8.3)
SODIUM SERPL-SCNC: 141 MMOL/L (ref 135–145)

## 2025-07-24 PROCEDURE — 36415 COLL VENOUS BLD VENIPUNCTURE: CPT | Mod: ZL | Performed by: NURSE PRACTITIONER

## 2025-07-24 PROCEDURE — 84155 ASSAY OF PROTEIN SERUM: CPT | Mod: ZL | Performed by: NURSE PRACTITIONER

## 2025-07-24 PROCEDURE — 86140 C-REACTIVE PROTEIN: CPT | Mod: ZL | Performed by: NURSE PRACTITIONER

## 2025-07-24 ASSESSMENT — ASTHMA QUESTIONNAIRES
QUESTION_3 LAST FOUR WEEKS HOW OFTEN DID YOUR ASTHMA SYMPTOMS (WHEEZING, COUGHING, SHORTNESS OF BREATH, CHEST TIGHTNESS OR PAIN) WAKE YOU UP AT NIGHT OR EARLIER THAN USUAL IN THE MORNING: NOT AT ALL
QUESTION_2 LAST FOUR WEEKS HOW OFTEN HAVE YOU HAD SHORTNESS OF BREATH: ONCE OR TWICE A WEEK
QUESTION_1 LAST FOUR WEEKS HOW MUCH OF THE TIME DID YOUR ASTHMA KEEP YOU FROM GETTING AS MUCH DONE AT WORK, SCHOOL OR AT HOME: A LITTLE OF THE TIME
QUESTION_4 LAST FOUR WEEKS HOW OFTEN HAVE YOU USED YOUR RESCUE INHALER OR NEBULIZER MEDICATION (SUCH AS ALBUTEROL): NOT AT ALL
QUESTION_5 LAST FOUR WEEKS HOW WOULD YOU RATE YOUR ASTHMA CONTROL: WELL CONTROLLED
ACT_TOTALSCORE: 22

## 2025-07-24 ASSESSMENT — PAIN SCALES - GENERAL: PAINLEVEL_OUTOF10: NO PAIN (0)

## 2025-07-24 NOTE — PROGRESS NOTES
"  Assessment & Plan     (K81.9,  K83.09) Cholecystitis with cholangitis ([facility])  (primary encounter diagnosis)  Comment: ***  Plan: Comprehensive metabolic panel, CRP,         inflammation       BMI  Estimated body mass index is 34.9 kg/m  as calculated from the following:    Height as of this encounter: 1.753 m (5' 9\").    Weight as of this encounter: 107.2 kg (236 lb 4.8 oz).   {Weight Management Plan needed for ACO:803979}    {Follow-up (Optional):191120}    Nuris Dickson is a 29 year old, presenting for the following health issues:  Surgical Followup (cholecystectomy)        7/24/2025     2:02 PM   Additional Questions   Roomed by Tiera reese   Accompanied by Self         7/24/2025     2:02 PM   Patient Reported Additional Medications   Patient reports taking the following new medications Antibiotics started and finished, and Oxycodone for pain     HPI:  \"Camila" is a 29-year-old female here for follow-up of her recent hospitalization.  She underwent laparoscopic cholecystectomy on 7/8/2025.    History of Present Illness       Reason for visit:  Gall bladder surgery    She eats 0-1 servings of fruits and vegetables daily.She consumes 1 sweetened beverage(s) daily.She exercises with enough effort to increase her heart rate 9 or less minutes per day.  She exercises with enough effort to increase her heart rate 3 or less days per week. She is missing 2 dose(s) of medications per week.  She is not taking prescribed medications regularly due to remembering to take.        Concern - Surgical follow up    Description: Pt underwent laparoscopic cholecystectomy on July 8th.  Procedure done at [facility] in [place]. Surgery done due to irritation/infection. Also had elevated bilirubin level    {additonal problems for provider to add (Optional):963039}    {ROS Picklists (Optional):887587}      Objective    /70 (BP Location: Right arm, Patient Position: Sitting, Cuff Size: Adult Large)   Pulse 78   Temp 98.1  F " "(36.7  C) (Tympanic)   Resp 16   Ht 1.753 m (5' 9\")   Wt 107.2 kg (236 lb 4.8 oz)   LMP 06/29/2025 (Exact Date)   SpO2 98%   BMI 34.90 kg/m    Body mass index is 34.9 kg/m .  Physical Exam   {Exam List (Optional):283676}    {Diagnostic Test Results (Optional):949990}        Signed Electronically by: HEATH Mayer CNP  {Email feedback regarding this note to [email or web address]   :[id number]}  "